# Patient Record
Sex: MALE | Race: WHITE | NOT HISPANIC OR LATINO | Employment: FULL TIME | URBAN - METROPOLITAN AREA
[De-identification: names, ages, dates, MRNs, and addresses within clinical notes are randomized per-mention and may not be internally consistent; named-entity substitution may affect disease eponyms.]

---

## 2024-06-28 ENCOUNTER — OFFICE VISIT (OUTPATIENT)
Dept: FAMILY MEDICINE | Facility: CLINIC | Age: 34
End: 2024-06-28
Payer: COMMERCIAL

## 2024-06-28 ENCOUNTER — OFFICE VISIT (OUTPATIENT)
Dept: ORTHOPEDICS | Facility: CLINIC | Age: 34
End: 2024-06-28
Payer: COMMERCIAL

## 2024-06-28 ENCOUNTER — HOSPITAL ENCOUNTER (OUTPATIENT)
Dept: RADIOLOGY | Facility: HOSPITAL | Age: 34
Discharge: HOME | End: 2024-06-28
Attending: ORTHOPAEDIC SURGERY
Payer: COMMERCIAL

## 2024-06-28 VITALS
HEART RATE: 105 BPM | DIASTOLIC BLOOD PRESSURE: 106 MMHG | SYSTOLIC BLOOD PRESSURE: 156 MMHG | TEMPERATURE: 98 F | HEIGHT: 72 IN | OXYGEN SATURATION: 96 % | WEIGHT: 298.5 LBS | BODY MASS INDEX: 40.43 KG/M2

## 2024-06-28 VITALS — BODY MASS INDEX: 40.43 KG/M2 | WEIGHT: 298.5 LBS | HEIGHT: 72 IN

## 2024-06-28 DIAGNOSIS — M79.641 BILATERAL HAND PAIN: Primary | ICD-10-CM

## 2024-06-28 DIAGNOSIS — M79.641 BILATERAL HAND PAIN: ICD-10-CM

## 2024-06-28 DIAGNOSIS — M67.40 GANGLION CYST: ICD-10-CM

## 2024-06-28 DIAGNOSIS — E66.01 SEVERE OBESITY (BMI >= 40): ICD-10-CM

## 2024-06-28 DIAGNOSIS — Z00.00 ANNUAL PHYSICAL EXAM: Primary | ICD-10-CM

## 2024-06-28 DIAGNOSIS — I10 PRIMARY HYPERTENSION: ICD-10-CM

## 2024-06-28 DIAGNOSIS — R06.81 APNEA: ICD-10-CM

## 2024-06-28 DIAGNOSIS — M79.642 BILATERAL HAND PAIN: ICD-10-CM

## 2024-06-28 DIAGNOSIS — R21 RASH: ICD-10-CM

## 2024-06-28 DIAGNOSIS — M79.642 BILATERAL HAND PAIN: Primary | ICD-10-CM

## 2024-06-28 PROCEDURE — 73130 X-RAY EXAM OF HAND: CPT | Mod: 26,RT,, | Performed by: RADIOLOGY

## 2024-06-28 PROCEDURE — 1159F MED LIST DOCD IN RCRD: CPT | Mod: CPTII,S$GLB,, | Performed by: ORTHOPAEDIC SURGERY

## 2024-06-28 PROCEDURE — 99999 PR PBB SHADOW E&M-EST. PATIENT-LVL III: CPT | Mod: PBBFAC,,, | Performed by: ORTHOPAEDIC SURGERY

## 2024-06-28 PROCEDURE — 4010F ACE/ARB THERAPY RXD/TAKEN: CPT | Mod: CPTII,S$GLB,, | Performed by: ORTHOPAEDIC SURGERY

## 2024-06-28 PROCEDURE — 1160F RVW MEDS BY RX/DR IN RCRD: CPT | Mod: CPTII,S$GLB,, | Performed by: ORTHOPAEDIC SURGERY

## 2024-06-28 PROCEDURE — 99999 PR PBB SHADOW E&M-NEW PATIENT-LVL IV: CPT | Mod: PBBFAC,,, | Performed by: FAMILY MEDICINE

## 2024-06-28 PROCEDURE — 99203 OFFICE O/P NEW LOW 30 MIN: CPT | Mod: S$GLB,,, | Performed by: ORTHOPAEDIC SURGERY

## 2024-06-28 PROCEDURE — 73130 X-RAY EXAM OF HAND: CPT | Mod: TC,RT

## 2024-06-28 PROCEDURE — 3008F BODY MASS INDEX DOCD: CPT | Mod: CPTII,S$GLB,, | Performed by: ORTHOPAEDIC SURGERY

## 2024-06-28 RX ORDER — LISINOPRIL 20 MG/1
20 TABLET ORAL NIGHTLY
Qty: 30 TABLET | Refills: 1 | Status: SHIPPED | OUTPATIENT
Start: 2024-06-28 | End: 2025-06-28

## 2024-06-28 NOTE — PROGRESS NOTES
Subjective:     Patient ID: Bebeto Ho is a 33 y.o. male.    Chief Complaint: Pain of the Right Wrist      HPI:  The patient is a 33-year-old male with a right wrist radial volar ganglion cyst.  It is not symptomatic at this time and not bothersome    History reviewed. No pertinent past medical history.  Past Surgical History:   Procedure Laterality Date    VASECTOMY       Family History   Family history unknown: Yes     Social History     Socioeconomic History    Marital status:     Number of children: 2   Occupational History    Occupation: teacher   Tobacco Use    Smoking status: Never     Passive exposure: Never    Smokeless tobacco: Never   Substance and Sexual Activity    Alcohol use: Yes    Drug use: Never    Sexual activity: Yes     Partners: Female     Social Determinants of Health     Financial Resource Strain: Low Risk  (6/27/2024)    Overall Financial Resource Strain (CARDIA)     Difficulty of Paying Living Expenses: Not very hard   Food Insecurity: No Food Insecurity (6/27/2024)    Hunger Vital Sign     Worried About Running Out of Food in the Last Year: Never true     Ran Out of Food in the Last Year: Never true   Physical Activity: Unknown (6/27/2024)    Exercise Vital Sign     Days of Exercise per Week: 0 days   Stress: Stress Concern Present (6/27/2024)    [employer] - Occupational Stress Questionnaire     Feeling of Stress : To some extent   Housing Stability: Unknown (6/27/2024)    Housing Stability Vital Sign     Unable to Pay for Housing in the Last Year: No     Medication List with Changes/Refills   Current Medications    LISINOPRIL (PRINIVIL,ZESTRIL) 20 MG TABLET    Take 1 tablet (20 mg total) by mouth every evening.     Review of patient's allergies indicates:   Allergen Reactions    Poison ivy extract Blisters, Itching, Rash and Swelling    Wasp venom Hives and Swelling     Review of Systems   Constitutional: Negative for malaise/fatigue.   HENT:   Negative for hearing loss.    Eyes:  Negative for double vision and visual disturbance.   Cardiovascular:  Negative for chest pain.   Respiratory:  Positive for sleep disturbances due to breathing.    Endocrine: Negative for cold intolerance.   Hematologic/Lymphatic: Does not bruise/bleed easily.   Skin:  Negative for poor wound healing and suspicious lesions.   Musculoskeletal:  Negative for gout, joint pain and joint swelling.   Gastrointestinal:  Negative for nausea and vomiting.   Genitourinary:  Negative for dysuria.   Neurological:  Negative for numbness, paresthesias and sensory change.   Psychiatric/Behavioral:  Negative for depression, memory loss and substance abuse. The patient is not nervous/anxious.    Allergic/Immunologic: Negative for persistent infections.       Objective:   Body mass index is 40.48 kg/m².  There were no vitals filed for this visit.             General    Constitutional: He is oriented to person, place, and time. He appears well-developed and well-nourished. No distress.   HENT:   Head: Normocephalic.   Eyes: EOM are normal.   Pulmonary/Chest: Effort normal.   Neurological: He is oriented to person, place, and time.   Psychiatric: He has a normal mood and affect.             Right Hand/Wrist Exam     Inspection   Scars: Wrist - absent Hand -  absent  Effusion: Wrist - absent Hand -  absent    Other     Neuorologic Exam    Median Distribution: normal  Ulnar Distribution: normal  Radial Distribution: normal    Comments:  The patient has a 1 cm right wrist radial volar ganglion cyst.  It is clinically asymptomatic          Vascular Exam       Capillary Refill  Right Hand: normal capillary refill          Relevant imaging results reviewed and interpreted by me, discussed with the patient and / or family today radiographs right wrist were normal  Assessment:     Encounter Diagnosis   Name Primary?    Ganglion cyst     Right radial volar wrist    Plan:       The patient wishes to watch it  at this point if it does get larger or become bothersome he could have an excision.              Disclaimer: This note was prepared using a voice recognition system and is likely to have sound alike errors within the text.

## 2024-06-28 NOTE — PROGRESS NOTES
Subjective:       Patient ID: Bebeto Ho is a 33 y.o. male.    Chief Complaint: Establish Care      HPI Comments:       Current Outpatient Medications:     lisinopriL (PRINIVIL,ZESTRIL) 20 MG tablet, Take 1 tablet (20 mg total) by mouth every evening., Disp: 30 tablet, Rfl: 1      This my 1st time seeing this patient.  He would like to establish care.  He has not had much medical care lately.     at [facility].  .  Two children.  Has had vasectomy.      Has had some elevated blood pressure readings at different times recently.  Gained 100 lb over the last few years.  Had previously lost that weight.    Wife says he has apnea.  Sometimes lots of sweating at night.  Not always rested.  Generally tired    Complains of a rash/mottling on his feet and ankles.  Also has a cyst on the anterior surface of his right wrist.    No tobacco or nicotine products.  Drinks at least 2 cocktails every night.  Not much exercise    Family history:  Does not know much about the health of his parents, though his father  early.  Thinks it grandmother had heart disease, diabetes, Alzheimer's.      Review of Systems   Constitutional:  Negative for activity change, appetite change and fever.   HENT:  Negative for sore throat.    Respiratory:  Negative for cough and shortness of breath.    Cardiovascular:  Negative for chest pain.   Gastrointestinal:  Negative for abdominal pain, diarrhea and nausea.   Genitourinary:  Negative for difficulty urinating.   Musculoskeletal:  Negative for arthralgias and myalgias.   Skin:  Positive for color change.   Neurological:  Negative for dizziness and headaches.       Objective:      Vitals:    24 0936   BP: (!) 156/106   Pulse: 105   Temp: 98.4 °F (36.9 °C)   TempSrc: Oral   SpO2: 96%   Weight: 135.4 kg (298 lb 8.1 oz)   Height: 6' (1.829 m)   PainSc: 0-No pain     Physical Exam  Vitals and nursing note reviewed.   Constitutional:       General: He is not in acute  distress.     Appearance: He is well-developed. He is not diaphoretic.   HENT:      Head: Normocephalic.   Neck:      Thyroid: No thyromegaly.   Cardiovascular:      Rate and Rhythm: Normal rate and regular rhythm.      Heart sounds: Normal heart sounds. No murmur heard.  Pulmonary:      Effort: Pulmonary effort is normal.      Breath sounds: Normal breath sounds. No wheezing or rales.   Abdominal:      General: There is no distension.      Palpations: Abdomen is soft.   Musculoskeletal:      Cervical back: Neck supple.      Right lower leg: No edema.      Left lower leg: No edema.   Lymphadenopathy:      Cervical: No cervical adenopathy.   Skin:     General: Skin is warm and dry.      Comments: Petechial type rash on ankles.  Lenox tinea pedis   Neurological:      Mental Status: He is alert and oriented to person, place, and time.   Psychiatric:         Behavior: Behavior normal.         Thought Content: Thought content normal.         Judgment: Judgment normal.         Assessment:       1. Annual physical exam    2. Primary hypertension    3. Ganglion cyst    4. Rash    5. Apnea    6. Severe obesity (BMI >= 40)        Plan:   Annual physical exam  Comments:  Lipid profile, A1c  Orders:  -     Comprehensive Metabolic Panel; Future; Expected date: 06/28/2024  -     Hemoglobin A1C; Future; Expected date: 06/28/2024  -     Hepatitis C Antibody; Future; Expected date: 06/28/2024  -     HIV 1/2 Ag/Ab (4th Gen); Future; Expected date: 06/28/2024  -     Lipid Panel; Future; Expected date: 06/28/2024  -     Testosterone; Future; Expected date: 06/28/2024  -     TSH; Future; Expected date: 06/28/2024  -     CBC Auto Differential; Future; Expected date: 06/28/2024    Primary hypertension  Comments:  Start lisinopril 20 mg.  Recheck at lab visit next week.  Follow-up here in one-month.  Orders:  -     lisinopriL (PRINIVIL,ZESTRIL) 20 MG tablet; Take 1 tablet (20 mg total) by mouth every evening.  Dispense: 30 tablet;  Refill: 1    Ganglion cyst  Comments:  Orthopedic consult  Orders:  -     Ambulatory referral/consult to Orthopedics; Future; Expected date: 07/05/2024    Rash  Comments:  Derm consult  Orders:  -     Ambulatory referral/consult to Dermatology; Future; Expected date: 07/05/2024    Apnea  Comments:  Sleep evaluation ordered  Orders:  -     Ambulatory referral/consult to Sleep Disorders; Future; Expected date: 07/05/2024    Severe obesity (BMI >= 40)  Comments:  Goal to lose to 250 lb over the next few years.  Cut back alcohol.  Increase exercise

## 2024-07-03 ENCOUNTER — TELEPHONE (OUTPATIENT)
Dept: SLEEP MEDICINE | Facility: CLINIC | Age: 34
End: 2024-07-03

## 2024-07-03 ENCOUNTER — OFFICE VISIT (OUTPATIENT)
Dept: SLEEP MEDICINE | Facility: CLINIC | Age: 34
End: 2024-07-03
Payer: COMMERCIAL

## 2024-07-03 VITALS
DIASTOLIC BLOOD PRESSURE: 101 MMHG | HEIGHT: 72 IN | SYSTOLIC BLOOD PRESSURE: 182 MMHG | HEART RATE: 103 BPM | RESPIRATION RATE: 16 BRPM | BODY MASS INDEX: 40.46 KG/M2 | OXYGEN SATURATION: 96 % | WEIGHT: 298.75 LBS

## 2024-07-03 DIAGNOSIS — R06.81 APNEA: ICD-10-CM

## 2024-07-03 DIAGNOSIS — I10 PRIMARY HYPERTENSION: ICD-10-CM

## 2024-07-03 DIAGNOSIS — G47.33 OSA (OBSTRUCTIVE SLEEP APNEA): Primary | ICD-10-CM

## 2024-07-03 DIAGNOSIS — E66.01 SEVERE OBESITY (BMI >= 40): ICD-10-CM

## 2024-07-03 PROCEDURE — 99204 OFFICE O/P NEW MOD 45 MIN: CPT | Mod: S$GLB,,, | Performed by: NURSE PRACTITIONER

## 2024-07-03 PROCEDURE — 3008F BODY MASS INDEX DOCD: CPT | Mod: CPTII,S$GLB,, | Performed by: NURSE PRACTITIONER

## 2024-07-03 PROCEDURE — 3077F SYST BP >= 140 MM HG: CPT | Mod: CPTII,S$GLB,, | Performed by: NURSE PRACTITIONER

## 2024-07-03 PROCEDURE — 3080F DIAST BP >= 90 MM HG: CPT | Mod: CPTII,S$GLB,, | Performed by: NURSE PRACTITIONER

## 2024-07-03 PROCEDURE — 1159F MED LIST DOCD IN RCRD: CPT | Mod: CPTII,S$GLB,, | Performed by: NURSE PRACTITIONER

## 2024-07-03 PROCEDURE — 4010F ACE/ARB THERAPY RXD/TAKEN: CPT | Mod: CPTII,S$GLB,, | Performed by: NURSE PRACTITIONER

## 2024-07-03 PROCEDURE — 1160F RVW MEDS BY RX/DR IN RCRD: CPT | Mod: CPTII,S$GLB,, | Performed by: NURSE PRACTITIONER

## 2024-07-03 PROCEDURE — 99999 PR PBB SHADOW E&M-EST. PATIENT-LVL IV: CPT | Mod: PBBFAC,,, | Performed by: NURSE PRACTITIONER

## 2024-07-03 NOTE — PROGRESS NOTES
Subjective:      Patient ID: Bebeto Ho is a 33 y.o. male.    Chief Complaint: Sleep Apnea    HPI    Patient presents today for evaluation of sleep apnea.  Patient with snoring and witnessed apneas. Patient not having problems falling asleep, but wakes up frequently throughout the night. Patient with daytime hypersomnolence.  Patient has had symptoms for a few years. Comorbidities include newly dx HTN.   Bedtime: 10:30 PM  Wake time: 7 AM    STOP - BANG Questionnaire:     1. Snoring : Do you snore loudly ?    Yes    2. Tired : Do you often feel tired, fatigued, or sleepy during daytime?   Yes    3. Observed: Has anyone observed you stop breathing during your sleep?   Yes    4. Blood pressure : Do you have or are you being treated for high blood pressure?   Yes    5. BMI :BMI more than 35 kg/m2?   Yes    6. Age : Age over 50 yr old?   No    7. Neck circumference: Neck circumference greater than 40 cm?   Yes    8. Gender: Gender male?   Yes    High risk of QIANA: Yes 5 - 8  Intermediate risk of QIANA: Yes 3 - 4  Low risk of QIANA: Yes 0 - 2      References:   STOP Questionnaire   A Tool to Screen Patients for Obstructive Sleep Apnea: ISSAC Perez.R.C.P.C., Jorgito Long M.B.B.S., Tylor Hammond M.D.,Brielle Faria, Ph.D., MILTON Lanza.B.B.S.,_ MILTON De La Garza.Sc.,_ Pietro Ellis M.D., João Altamirano F.R.C.P.C.; Anesthesiology 2008; 108:812-21 Copyright © 2008, the American Society of Anesthesiologists, Inc. [employer]       [place] Questionnaire (validated QIANA screening questionnaire)    Yes -- Snoring/apnea    Yes -- Fatigue    Body mass index is Body mass index is 40.51 kg/m²..  (>25 is overweight, >30 is obese)    Blood Pressure = Hypertension  (PreHTN 120-139/80-89, Stg1 140-159/90-99, Stg2 >160/>100)  [place] = three of three QIANA categories are positive (high risk is 2-3 positive categories)         7/3/2024    10:00 AM   EPWORTH SLEEPINESS SCALE   Sitting and  reading 0   Watching TV 0   Sitting, inactive in a public place (e.g. a theatre or a meeting) 0   As a passenger in a car for an hour without a break 0   Lying down to rest in the afternoon when circumstances permit 2   Sitting and talking to someone 0   Sitting quietly after a lunch without alcohol 0   In a car, while stopped for a few minutes in traffic 0   Total score 2      (validated sleepiness questionnaire with a higher score indicating greater sleepiness; range 0-24)    Patient Active Problem List   Diagnosis    Primary hypertension    Apnea    Severe obesity (BMI >= 40)         BP (!) 182/101 (BP Location: Right arm) Comment (BP Location): 170 101  Pulse 103   Resp 16   Ht 6' (1.829 m)   Wt 135.5 kg (298 lb 11.6 oz)   SpO2 96%   BMI 40.51 kg/m²   Body mass index is 40.51 kg/m².    Review of Systems   Constitutional:  Positive for fatigue.   HENT: Negative.     Respiratory:  Positive for snoring and somnolence.    Cardiovascular: Negative.    Musculoskeletal: Negative.    Gastrointestinal: Negative.    Neurological: Negative.    Psychiatric/Behavioral:  Positive for sleep disturbance.          Objective:      Physical Exam  Constitutional:       Appearance: He is well-developed. He is obese.   HENT:      Head: Normocephalic and atraumatic.      Mouth/Throat:      Comments: Mallampati Score: II  Neck:      Comments: Neck circumference: 17 inches   Cardiovascular:      Rate and Rhythm: Normal rate and regular rhythm.   Pulmonary:      Effort: Pulmonary effort is normal.      Breath sounds: Normal breath sounds.   Abdominal:      Palpations: Abdomen is soft.   Musculoskeletal:         General: Normal range of motion.      Cervical back: Normal range of motion and neck supple.   Skin:     General: Skin is warm and dry.   Neurological:      Mental Status: He is alert and oriented to person, place, and time.   Psychiatric:         Mood and Affect: Mood normal.         Behavior: Behavior normal.        Personal Diagnostic Review      Assessment:     1. QIANA (obstructive sleep apnea)    2. Apnea    3. Severe obesity (BMI >= 40)    4. Primary hypertension       Outpatient Encounter Medications as of 7/3/2024   Medication Sig Dispense Refill    lisinopriL (PRINIVIL,ZESTRIL) 20 MG tablet Take 1 tablet (20 mg total) by mouth every evening. 30 tablet 1     No facility-administered encounter medications on file as of 7/3/2024.     Orders Placed This Encounter   Procedures    Home Sleep Study     Standing Status:   Future     Standing Expiration Date:   7/3/2025     Scheduling Instructions:      2 night protocol     Plan:     1. QIANA (obstructive sleep apnea)  -     Home Sleep Study; Future    2. Apnea  Comments:  Sleep evaluation ordered  Overview:  Sleep evaluation ordered    Orders:  -     Ambulatory referral/consult to Sleep Disorders    3. Severe obesity (BMI >= 40)  Overview:  Goal to lose to 250 lb over the next few years.  Cut back alcohol.  Increase exercise      4. Primary hypertension  Overview:  Start lisinopril 20 mg.  Recheck at lab visit next week.  Follow-up here in one-month.

## 2024-07-03 NOTE — TELEPHONE ENCOUNTER
----- Message from Clarisa Frost sent at 7/3/2024  4:10 PM CDT -----  [facility]

## 2024-07-05 ENCOUNTER — LAB VISIT (OUTPATIENT)
Dept: LAB | Facility: HOSPITAL | Age: 34
End: 2024-07-05
Attending: FAMILY MEDICINE
Payer: COMMERCIAL

## 2024-07-05 ENCOUNTER — CLINICAL SUPPORT (OUTPATIENT)
Dept: FAMILY MEDICINE | Facility: CLINIC | Age: 34
End: 2024-07-05
Payer: COMMERCIAL

## 2024-07-05 VITALS — HEART RATE: 83 BPM | DIASTOLIC BLOOD PRESSURE: 97 MMHG | SYSTOLIC BLOOD PRESSURE: 155 MMHG

## 2024-07-05 DIAGNOSIS — Z00.00 ANNUAL PHYSICAL EXAM: ICD-10-CM

## 2024-07-05 DIAGNOSIS — I10 PRIMARY HYPERTENSION: Primary | ICD-10-CM

## 2024-07-05 LAB
ALBUMIN SERPL BCP-MCNC: 4 G/DL (ref 3.5–5.2)
ALP SERPL-CCNC: 59 U/L (ref 55–135)
ALT SERPL W/O P-5'-P-CCNC: 51 U/L (ref 10–44)
ANION GAP SERPL CALC-SCNC: 11 MMOL/L (ref 8–16)
AST SERPL-CCNC: 24 U/L (ref 10–40)
BASOPHILS # BLD AUTO: 0.05 K/UL (ref 0–0.2)
BASOPHILS NFR BLD: 1 % (ref 0–1.9)
BILIRUB SERPL-MCNC: 0.7 MG/DL (ref 0.1–1)
BUN SERPL-MCNC: 14 MG/DL (ref 6–20)
CALCIUM SERPL-MCNC: 9.3 MG/DL (ref 8.7–10.5)
CHLORIDE SERPL-SCNC: 105 MMOL/L (ref 95–110)
CHOLEST SERPL-MCNC: 224 MG/DL (ref 120–199)
CHOLEST/HDLC SERPL: 4.7 {RATIO} (ref 2–5)
CO2 SERPL-SCNC: 24 MMOL/L (ref 23–29)
CREAT SERPL-MCNC: 0.9 MG/DL (ref 0.5–1.4)
DIFFERENTIAL METHOD BLD: ABNORMAL
EOSINOPHIL # BLD AUTO: 0.1 K/UL (ref 0–0.5)
EOSINOPHIL NFR BLD: 1.4 % (ref 0–8)
ERYTHROCYTE [DISTWIDTH] IN BLOOD BY AUTOMATED COUNT: 11.9 % (ref 11.5–14.5)
EST. GFR  (NO RACE VARIABLE): >60 ML/MIN/1.73 M^2
ESTIMATED AVG GLUCOSE: 105 MG/DL (ref 68–131)
GLUCOSE SERPL-MCNC: 103 MG/DL (ref 70–110)
HBA1C MFR BLD: 5.3 % (ref 4–5.6)
HCT VFR BLD AUTO: 44.7 % (ref 40–54)
HCV AB SERPL QL IA: NORMAL
HDLC SERPL-MCNC: 48 MG/DL (ref 40–75)
HDLC SERPL: 21.4 % (ref 20–50)
HGB BLD-MCNC: 15 G/DL (ref 14–18)
HIV 1+2 AB+HIV1 P24 AG SERPL QL IA: NORMAL
IMM GRANULOCYTES # BLD AUTO: 0.03 K/UL (ref 0–0.04)
IMM GRANULOCYTES NFR BLD AUTO: 0.6 % (ref 0–0.5)
LDLC SERPL CALC-MCNC: 159.8 MG/DL (ref 63–159)
LYMPHOCYTES # BLD AUTO: 1.3 K/UL (ref 1–4.8)
LYMPHOCYTES NFR BLD: 25.8 % (ref 18–48)
MCH RBC QN AUTO: 28.6 PG (ref 27–31)
MCHC RBC AUTO-ENTMCNC: 33.6 G/DL (ref 32–36)
MCV RBC AUTO: 85 FL (ref 82–98)
MONOCYTES # BLD AUTO: 0.5 K/UL (ref 0.3–1)
MONOCYTES NFR BLD: 10.6 % (ref 4–15)
NEUTROPHILS # BLD AUTO: 3.1 K/UL (ref 1.8–7.7)
NEUTROPHILS NFR BLD: 60.6 % (ref 38–73)
NONHDLC SERPL-MCNC: 176 MG/DL
NRBC BLD-RTO: 0 /100 WBC
PLATELET # BLD AUTO: 298 K/UL (ref 150–450)
PMV BLD AUTO: 9.6 FL (ref 9.2–12.9)
POTASSIUM SERPL-SCNC: 4 MMOL/L (ref 3.5–5.1)
PROT SERPL-MCNC: 6.9 G/DL (ref 6–8.4)
RBC # BLD AUTO: 5.24 M/UL (ref 4.6–6.2)
SODIUM SERPL-SCNC: 140 MMOL/L (ref 136–145)
TESTOST SERPL-MCNC: 348 NG/DL (ref 304–1227)
TRIGL SERPL-MCNC: 81 MG/DL (ref 30–150)
TSH SERPL DL<=0.005 MIU/L-ACNC: 1.95 UIU/ML (ref 0.4–4)
WBC # BLD AUTO: 5.08 K/UL (ref 3.9–12.7)

## 2024-07-05 PROCEDURE — 85025 COMPLETE CBC W/AUTO DIFF WBC: CPT | Performed by: FAMILY MEDICINE

## 2024-07-05 PROCEDURE — 83036 HEMOGLOBIN GLYCOSYLATED A1C: CPT | Performed by: FAMILY MEDICINE

## 2024-07-05 PROCEDURE — 87389 HIV-1 AG W/HIV-1&-2 AB AG IA: CPT | Performed by: FAMILY MEDICINE

## 2024-07-05 PROCEDURE — 99999 PR PBB SHADOW E&M-EST. PATIENT-LVL I: CPT | Mod: PBBFAC,,,

## 2024-07-05 PROCEDURE — 84443 ASSAY THYROID STIM HORMONE: CPT | Performed by: FAMILY MEDICINE

## 2024-07-05 PROCEDURE — 84403 ASSAY OF TOTAL TESTOSTERONE: CPT | Performed by: FAMILY MEDICINE

## 2024-07-05 PROCEDURE — 80061 LIPID PANEL: CPT | Performed by: FAMILY MEDICINE

## 2024-07-05 PROCEDURE — 36415 COLL VENOUS BLD VENIPUNCTURE: CPT | Mod: PO | Performed by: FAMILY MEDICINE

## 2024-07-05 PROCEDURE — 86803 HEPATITIS C AB TEST: CPT | Performed by: FAMILY MEDICINE

## 2024-07-05 PROCEDURE — 80053 COMPREHEN METABOLIC PANEL: CPT | Performed by: FAMILY MEDICINE

## 2024-07-19 PROCEDURE — 95806 SLEEP STUDY UNATT&RESP EFFT: CPT | Performed by: INTERNAL MEDICINE

## 2024-07-20 PROCEDURE — 95806 SLEEP STUDY UNATT&RESP EFFT: CPT

## 2024-07-22 ENCOUNTER — HOSPITAL ENCOUNTER (OUTPATIENT)
Dept: SLEEP MEDICINE | Facility: HOSPITAL | Age: 34
Discharge: HOME | End: 2024-07-22
Attending: NURSE PRACTITIONER
Payer: COMMERCIAL

## 2024-07-22 DIAGNOSIS — G47.33 OSA (OBSTRUCTIVE SLEEP APNEA): ICD-10-CM

## 2024-07-22 PROCEDURE — 95806 SLEEP STUDY UNATT&RESP EFFT: CPT | Performed by: INTERNAL MEDICINE

## 2024-07-23 PROBLEM — G47.33 OSA (OBSTRUCTIVE SLEEP APNEA): Status: ACTIVE | Noted: 2024-07-23

## 2024-07-23 PROCEDURE — 95800 SLP STDY UNATTENDED: CPT | Mod: 26,,, | Performed by: INTERNAL MEDICINE

## 2024-07-23 NOTE — PROCEDURES
PHYSICIAN INTERPRETATION AND COMMENTS: Findings are consistent with mild, positional obstructive sleep apnea(QIANA).  There is insufficient non-supine study time to assess the severity of non-positional obstructive sleep apnea (QIANA).  CLINICAL HISTORY: 33 year old male presented with: 18 inch neck, BMI of 40.4, an [place] sleepiness score of 0, history of  hypertension and symptoms of nocturnal snoring and witnessed apneas. Based on the clinical history, the patient has a  high pre-test probability of having Severe QIANA.  SLEEP STUDY FINDINGS: Patient underwent a 2 night Home Sleep Test and by behavioral criteria, slept for approximately  13.48 hours, with a sleep latency of 6 minutes and a sleep efficiency of 98%. The patient slept supine 99.4% of the night  based on valid recording time of 13.53 hours. When considering more subtle measures of sleep disordered breathing, the  overall respiratory disturbance index is mild(RDI=11) based on a 1% hypopnea desaturation criteria with confirmation by  surrogate arousal indicators. The apneas/hypopneas are accompanied by minimal oxygen desaturation (percent time  below 90% SpO2: 0%, Min SpO2: 84.2%). The average desaturation across all sleep disordered breathing events is 2.5%.  Snoring occurs for 11.7% (30 dB) of the study, 1.1% is very loud. The mean pulse rate is 80.2 BPM, with frequent pulse rate  variability (56 events with >= 6 BPM increase/decrease per hour).  TREATMENT CONSIDERATIONS: For a patient with mild asymptomatic QIANA, nasal continuous positive airway pressure  (CPAP/AutoPAP) therapy or alternative therapies for treatment should be considered, i.e., Mandibular advancement splint  (MAS), referral to an ENT surgeon for modification to the airway, and/or weight loss or behavioral therapy to reduce the  potential risk of daytime somnolence, hypertension, cardiovascular disease, stroke and diabetes. Based on the QIANA  Supine data in the study, a Mandibular  "Advancement Splint (MAS) will likely provide treatment benefit independent of QIANA  severity.  DISEASE MANAGEMENT CONSIDERATIONS: None.      Dear [place]  [id number] [facility]  [place] [id number]/Joon Duarte MD         The sleep study that you ordered is complete.  You have ordered sleep LAB services to perform the sleep study for Bebeto Ho .      Please find Sleep Study result in  the "Media tab" of Chart Review menu.        You can look  for the report in the  Media by the document type "Sleep Study Documents". Alphabetizing  "Document type" column helps to find the SLEEP STUDY report  Faster.       As the ordering provider, you are responsible for reviewing the results and implementing a treatment plan with your patient.    If you need a Sleep Medicine provider to explain the sleep study findings and arrange treatment for the patient, please refer patient for consultation to our Sleep Clinic via [facility] with Ambulatory Consult Sleep.     To do that please place an order for an  "Ambulatory Consult Sleep" -  order , it will go to our clinic work queue for our staff  to contact the patient for an appointment.      For any questions, please contact our sleep lab  staff at [phone] to talk to clinical staff          Omar Boyer MD    "

## 2024-07-26 ENCOUNTER — LAB VISIT (OUTPATIENT)
Dept: LAB | Facility: HOSPITAL | Age: 34
End: 2024-07-26
Attending: FAMILY MEDICINE
Payer: COMMERCIAL

## 2024-07-26 ENCOUNTER — TELEPHONE (OUTPATIENT)
Dept: PSYCHIATRY | Facility: CLINIC | Age: 34
End: 2024-07-26
Payer: COMMERCIAL

## 2024-07-26 ENCOUNTER — OFFICE VISIT (OUTPATIENT)
Dept: FAMILY MEDICINE | Facility: CLINIC | Age: 34
End: 2024-07-26
Payer: COMMERCIAL

## 2024-07-26 VITALS
HEIGHT: 72 IN | TEMPERATURE: 98 F | SYSTOLIC BLOOD PRESSURE: 134 MMHG | DIASTOLIC BLOOD PRESSURE: 92 MMHG | OXYGEN SATURATION: 97 % | WEIGHT: 298.5 LBS | HEART RATE: 96 BPM | BODY MASS INDEX: 40.43 KG/M2

## 2024-07-26 DIAGNOSIS — F41.9 ANXIETY: ICD-10-CM

## 2024-07-26 DIAGNOSIS — I10 PRIMARY HYPERTENSION: ICD-10-CM

## 2024-07-26 DIAGNOSIS — G47.33 OSA (OBSTRUCTIVE SLEEP APNEA): ICD-10-CM

## 2024-07-26 DIAGNOSIS — E78.00 PURE HYPERCHOLESTEROLEMIA: ICD-10-CM

## 2024-07-26 DIAGNOSIS — I10 PRIMARY HYPERTENSION: Primary | ICD-10-CM

## 2024-07-26 LAB
BILIRUB UR QL STRIP: NEGATIVE
CLARITY UR REFRACT.AUTO: CLEAR
COLOR UR AUTO: COLORLESS
GLUCOSE UR QL STRIP: NEGATIVE
HGB UR QL STRIP: NEGATIVE
KETONES UR QL STRIP: NEGATIVE
LEUKOCYTE ESTERASE UR QL STRIP: NEGATIVE
NITRITE UR QL STRIP: NEGATIVE
PH UR STRIP: 7 [PH] (ref 5–8)
PROT UR QL STRIP: NEGATIVE
SP GR UR STRIP: 1.01 (ref 1–1.03)
URN SPEC COLLECT METH UR: ABNORMAL

## 2024-07-26 PROCEDURE — 81003 URINALYSIS AUTO W/O SCOPE: CPT | Performed by: FAMILY MEDICINE

## 2024-07-26 PROCEDURE — 99999 PR PBB SHADOW E&M-EST. PATIENT-LVL III: CPT | Mod: PBBFAC,,, | Performed by: FAMILY MEDICINE

## 2024-07-26 RX ORDER — LISINOPRIL 40 MG/1
40 TABLET ORAL NIGHTLY
Qty: 90 TABLET | Refills: 1 | Status: SHIPPED | OUTPATIENT
Start: 2024-07-26 | End: 2025-07-26

## 2024-07-26 NOTE — TELEPHONE ENCOUNTER
----- Message from Mariam Aquino sent at 7/26/2024 10:37 AM CDT -----  Pt is calling in regards to be scheduled from referral to [facility]. Pt would like to be schedule on Fridays only mid morning. Pt can be reached at [phone].      Thanks  VINICIO

## 2024-07-26 NOTE — PROGRESS NOTES
Subjective:       Patient ID: Bebeto Ho is a 33 y.o. male.    Chief Complaint: Follow-up (4 week f/u)      HPI Comments:       Current Outpatient Medications:     lisinopriL (PRINIVIL,ZESTRIL) 40 MG tablet, Take 1 tablet (40 mg total) by mouth every evening., Disp: 90 tablet, Rfl: 1      One-month follow-up.  Taking lisinopril 20 mg daily.  Does not check blood pressure outside of here.  Blood pressure much improved.  Will increase dose today and get a nurse visit in 3 weeks.      Has cut his alcohol consumption down to about 2 drinks per day.  ALT was slightly high.  Will monitor.      Some situational anxiety.  No significant mood swings.  Wife concerned.  Will get a Psychology consult.      Review of Systems   Constitutional:  Negative for activity change, appetite change and fever.   HENT:  Negative for sore throat.    Respiratory:  Negative for cough and shortness of breath.    Cardiovascular:  Negative for chest pain.   Gastrointestinal:  Negative for abdominal pain, diarrhea and nausea.   Genitourinary:  Negative for difficulty urinating.   Musculoskeletal:  Negative for arthralgias and myalgias.   Neurological:  Negative for dizziness and headaches.   Psychiatric/Behavioral:  The patient is nervous/anxious.        Objective:      Vitals:    07/26/24 0856   BP: (!) 134/92   Pulse: 96   Temp: 97.8 °F (36.6 °C)   TempSrc: Tympanic   SpO2: 97%   Weight: 135.4 kg (298 lb 8.1 oz)   Height: 6' (1.829 m)   PainSc: 0-No pain     Physical Exam  Vitals and nursing note reviewed.   Constitutional:       General: He is not in acute distress.     Appearance: He is well-developed. He is not diaphoretic.   HENT:      Head: Normocephalic.   Neck:      Thyroid: No thyromegaly.   Cardiovascular:      Rate and Rhythm: Normal rate and regular rhythm.      Heart sounds: Normal heart sounds. No murmur heard.  Pulmonary:      Effort: Pulmonary effort is normal. No respiratory distress.      Breath sounds: Normal breath  sounds.   Abdominal:      General: There is no distension.      Palpations: Abdomen is soft.   Musculoskeletal:      Cervical back: Neck supple.   Lymphadenopathy:      Cervical: No cervical adenopathy.   Skin:     General: Skin is warm and dry.   Neurological:      Mental Status: He is alert and oriented to person, place, and time.   Psychiatric:         Mood and Affect: Mood normal.         Behavior: Behavior normal.         Thought Content: Thought content normal.         Judgment: Judgment normal.         Assessment:       1. Primary hypertension    2. Pure hypercholesterolemia    3. QIANA (obstructive sleep apnea)    4. Primary hypertension    5. Anxiety        Plan:   Primary hypertension  Comments:  Improved control.  Increase to 40 mg daily.  BNP today.  Nurse visit in 3 weeks  Orders:  -     lisinopriL (PRINIVIL,ZESTRIL) 40 MG tablet; Take 1 tablet (40 mg total) by mouth every evening.  Dispense: 90 tablet; Refill: 1  -     Urinalysis; Future; Expected date: 07/26/2024  -     Basic Metabolic Panel; Future; Expected date: 07/26/2024    Pure hypercholesterolemia  Comments:  Talked about decreasing fat in diet, and increasing fiber.  Has moderate alcohol    QIANA (obstructive sleep apnea)  Comments:  Has a appointment in September    Primary hypertension  Comments:  Start lisinopril 20 mg.  Recheck at lab visit next week.  Follow-up here in one-month.  Orders:  -     lisinopriL (PRINIVIL,ZESTRIL) 40 MG tablet; Take 1 tablet (40 mg total) by mouth every evening.  Dispense: 90 tablet; Refill: 1  -     Urinalysis; Future; Expected date: 07/26/2024  -     Basic Metabolic Panel; Future; Expected date: 07/26/2024    Anxiety  Comments:  Psychology consult  Orders:  -     Ambulatory referral/consult to Psychology; Future; Expected date: 08/02/2024

## 2024-08-01 ENCOUNTER — PATIENT MESSAGE (OUTPATIENT)
Dept: FAMILY MEDICINE | Facility: CLINIC | Age: 34
End: 2024-08-01
Payer: COMMERCIAL

## 2024-08-07 ENCOUNTER — OFFICE VISIT (OUTPATIENT)
Dept: SLEEP MEDICINE | Facility: CLINIC | Age: 34
End: 2024-08-07
Payer: COMMERCIAL

## 2024-08-07 VITALS
HEART RATE: 97 BPM | SYSTOLIC BLOOD PRESSURE: 116 MMHG | OXYGEN SATURATION: 98 % | WEIGHT: 299.63 LBS | DIASTOLIC BLOOD PRESSURE: 78 MMHG | RESPIRATION RATE: 18 BRPM | HEIGHT: 72 IN | BODY MASS INDEX: 40.58 KG/M2

## 2024-08-07 DIAGNOSIS — I10 PRIMARY HYPERTENSION: ICD-10-CM

## 2024-08-07 DIAGNOSIS — G47.33 OSA (OBSTRUCTIVE SLEEP APNEA): Primary | ICD-10-CM

## 2024-08-07 DIAGNOSIS — E66.01 SEVERE OBESITY (BMI >= 40): ICD-10-CM

## 2024-08-07 PROCEDURE — 3074F SYST BP LT 130 MM HG: CPT | Mod: CPTII,S$GLB,, | Performed by: NURSE PRACTITIONER

## 2024-08-07 PROCEDURE — 3008F BODY MASS INDEX DOCD: CPT | Mod: CPTII,S$GLB,, | Performed by: NURSE PRACTITIONER

## 2024-08-07 PROCEDURE — 4010F ACE/ARB THERAPY RXD/TAKEN: CPT | Mod: CPTII,S$GLB,, | Performed by: NURSE PRACTITIONER

## 2024-08-07 PROCEDURE — 99999 PR PBB SHADOW E&M-EST. PATIENT-LVL III: CPT | Mod: PBBFAC,,, | Performed by: NURSE PRACTITIONER

## 2024-08-07 PROCEDURE — 1159F MED LIST DOCD IN RCRD: CPT | Mod: CPTII,S$GLB,, | Performed by: NURSE PRACTITIONER

## 2024-08-07 PROCEDURE — 3078F DIAST BP <80 MM HG: CPT | Mod: CPTII,S$GLB,, | Performed by: NURSE PRACTITIONER

## 2024-08-07 PROCEDURE — 3044F HG A1C LEVEL LT 7.0%: CPT | Mod: CPTII,S$GLB,, | Performed by: NURSE PRACTITIONER

## 2024-08-07 PROCEDURE — 99213 OFFICE O/P EST LOW 20 MIN: CPT | Mod: S$GLB,,, | Performed by: NURSE PRACTITIONER

## 2024-08-07 PROCEDURE — 1160F RVW MEDS BY RX/DR IN RCRD: CPT | Mod: CPTII,S$GLB,, | Performed by: NURSE PRACTITIONER

## 2024-08-16 ENCOUNTER — CLINICAL SUPPORT (OUTPATIENT)
Dept: FAMILY MEDICINE | Facility: CLINIC | Age: 34
End: 2024-08-16
Payer: COMMERCIAL

## 2024-08-16 ENCOUNTER — PATIENT MESSAGE (OUTPATIENT)
Dept: ORTHOPEDICS | Facility: CLINIC | Age: 34
End: 2024-08-16
Payer: COMMERCIAL

## 2024-08-16 VITALS — SYSTOLIC BLOOD PRESSURE: 126 MMHG | DIASTOLIC BLOOD PRESSURE: 80 MMHG

## 2024-08-16 DIAGNOSIS — I10 PRIMARY HYPERTENSION: Primary | ICD-10-CM

## 2024-08-16 PROCEDURE — 99999 PR PBB SHADOW E&M-EST. PATIENT-LVL II: CPT | Mod: PBBFAC,,,

## 2024-08-16 NOTE — PROGRESS NOTES
Patient came for BP check. Zeb reading in right arm was 126/80. He takes Lisinopril 40 mg every evening. He also brought [employer] form to have signed by Dr. Duarte from recent labs done.

## 2024-08-30 ENCOUNTER — OFFICE VISIT (OUTPATIENT)
Dept: ORTHOPEDICS | Facility: CLINIC | Age: 34
End: 2024-08-30
Payer: COMMERCIAL

## 2024-08-30 VITALS — HEIGHT: 72 IN | BODY MASS INDEX: 40.58 KG/M2 | WEIGHT: 299.63 LBS

## 2024-08-30 DIAGNOSIS — M67.40 GANGLION CYST: Primary | ICD-10-CM

## 2024-08-30 PROCEDURE — 99999 PR PBB SHADOW E&M-EST. PATIENT-LVL III: CPT | Mod: PBBFAC,,, | Performed by: ORTHOPAEDIC SURGERY

## 2024-08-30 NOTE — PROGRESS NOTES
Subjective:     Patient ID: Bebeto Ho is a 33 y.o. male.    Chief Complaint: Pain of the Right Wrist      HPI:  The patient is a 33-year-old male with a right wrist radial volar ganglion cyst increases in size that he wishes to have excised    History reviewed. No pertinent past medical history.  Past Surgical History:   Procedure Laterality Date    VASECTOMY       Family History   Family history unknown: Yes     Social History     Socioeconomic History    Marital status:     Number of children: 2   Occupational History    Occupation: teacher   Tobacco Use    Smoking status: Never     Passive exposure: Never    Smokeless tobacco: Never   Substance and Sexual Activity    Alcohol use: Yes    Drug use: Never    Sexual activity: Yes     Partners: Female     Social Determinants of Health     Financial Resource Strain: Low Risk  (6/27/2024)    Overall Financial Resource Strain (CARDIA)     Difficulty of Paying Living Expenses: Not very hard   Food Insecurity: No Food Insecurity (6/27/2024)    Hunger Vital Sign     Worried About Running Out of Food in the Last Year: Never true     Ran Out of Food in the Last Year: Never true   Physical Activity: Unknown (6/27/2024)    Exercise Vital Sign     Days of Exercise per Week: 0 days   Stress: Stress Concern Present (6/27/2024)    [employer] [place] [employer] - Occupational Stress Questionnaire     Feeling of Stress : To some extent   Housing Stability: Unknown (6/27/2024)    Housing Stability Vital Sign     Unable to Pay for Housing in the Last Year: No     Medication List with Changes/Refills   Current Medications    LISINOPRIL (PRINIVIL,ZESTRIL) 40 MG TABLET    Take 1 tablet (40 mg total) by mouth every evening.     Review of patient's allergies indicates:   Allergen Reactions    Poison ivy extract Blisters, Itching, Rash and Swelling    Wasp venom Hives and Swelling     Review of Systems   Constitutional: Negative for malaise/fatigue.   HENT:  Negative  for hearing loss.    Eyes:  Negative for double vision and visual disturbance.   Cardiovascular:  Negative for chest pain.   Respiratory:  Negative for shortness of breath.    Endocrine: Negative for cold intolerance.   Hematologic/Lymphatic: Does not bruise/bleed easily.   Skin:  Negative for poor wound healing and suspicious lesions.   Musculoskeletal:  Negative for gout, joint pain and joint swelling.   Gastrointestinal:  Negative for nausea and vomiting.   Genitourinary:  Negative for dysuria.   Neurological:  Negative for numbness, paresthesias and sensory change.   Psychiatric/Behavioral:  Negative for depression, memory loss and substance abuse. The patient is not nervous/anxious.    Allergic/Immunologic: Negative for persistent infections.       Objective:   Body mass index is 40.63 kg/m².  There were no vitals filed for this visit.             General    Constitutional: He is oriented to person, place, and time. He appears well-developed and well-nourished. No distress.   HENT:   Head: Normocephalic.   Mouth/Throat: Oropharynx is clear and moist.   Eyes: EOM are normal.   Cardiovascular:  Normal rate.            Pulmonary/Chest: Effort normal.   Abdominal: Soft.   Neurological: He is alert and oriented to person, place, and time. No cranial nerve deficit.   Psychiatric: He has a normal mood and affect.             Right Hand/Wrist Exam     Inspection   Scars: Wrist - absent Hand -  absent  Effusion: Wrist - absent Hand -  absent    Pain   Wrist - The patient exhibits pain of the scapholunate/lunate ECU.    Other     Neuorologic Exam    Median Distribution: normal  Ulnar Distribution: normal  Radial Distribution: normal    Comments:  There is a 2 cm right wrist radial volar ganglion cyst with pain at the extremes of motion          Vascular Exam       Capillary Refill  Right Hand: normal capillary refill          Relevant imaging results reviewed and interpreted by me, discussed with the patient and / or  family today radiographs right wrist were normal  Assessment:     Encounter Diagnosis   Name Primary?    Ganglion cyst Yes    Right wrist radial volar wrist    Plan:       The patient was counseled regarding excision ganglion cyst right radial volar wrist.  Risk complications and alternatives were discussed including the risk of infection, anesthetic risk, injury to nerves and vessels, loss of motion, and possible need for additional surgeries were discussed.  He seems to understand and agree to that surgery.  All questions were answered.  A 5% recurrence rate was quoted              Disclaimer: This note was prepared using a voice recognition system and is likely to have sound alike errors within the text.

## 2024-09-05 ENCOUNTER — PATIENT MESSAGE (OUTPATIENT)
Dept: ORTHOPEDICS | Facility: CLINIC | Age: 34
End: 2024-09-05
Payer: COMMERCIAL

## 2024-09-06 DIAGNOSIS — Z01.818 PREOP TESTING: ICD-10-CM

## 2024-09-06 DIAGNOSIS — M67.40 GANGLION CYST: Primary | ICD-10-CM

## 2024-09-13 DIAGNOSIS — Z01.818 PRE-OP EXAM: Primary | ICD-10-CM

## 2024-09-19 ENCOUNTER — PATIENT MESSAGE (OUTPATIENT)
Dept: PREADMISSION TESTING | Facility: HOSPITAL | Age: 34
End: 2024-09-19
Payer: COMMERCIAL

## 2024-09-19 ENCOUNTER — TELEPHONE (OUTPATIENT)
Dept: ORTHOPEDICS | Facility: CLINIC | Age: 34
End: 2024-09-19
Payer: COMMERCIAL

## 2024-09-19 NOTE — TELEPHONE ENCOUNTER
Spoke to the patient an was able to get him moved to 10/18/2024 per the patients request       ----- Message from Mara Hernandez sent at 9/19/2024  1:42 PM CDT -----  Contact: patient  Type:  Appointment Request    Name of Caller:Bebeto Ho   Would the patient rather a call back or a response via [facility]? Call   Best Call Back Number: [phone]   Additional Information:  pt would carlos to know if he can get the post-op appt he has on 10/16 rescheduled to 10/18.

## 2024-09-19 NOTE — TELEPHONE ENCOUNTER
Patient called the office back in regards to their appointment on 10/7/24. Patient wanted to know if Mrs. Linn Koenig PA-C had any other appointment times that day. Informed the patient that on 10/7/24 at 10:00 is the only time that Mrs. Linn Koenig PA-C has that day. Informed the patient that I can not move that appointment to another day due to the fact that Dr. Chavarria wants to see them in clinic 4-5 days after surgery. Patient is still schedule with Mrs. Linn Koenig PA-C on 10/724 at 10:00.verbalized understanding.

## 2024-09-20 ENCOUNTER — OFFICE VISIT (OUTPATIENT)
Dept: DERMATOLOGY | Facility: CLINIC | Age: 34
End: 2024-09-20
Payer: COMMERCIAL

## 2024-09-20 DIAGNOSIS — L81.7 PIGMENTED PURPURIC DERMATOSIS: ICD-10-CM

## 2024-09-20 PROCEDURE — 99999 PR PBB SHADOW E&M-EST. PATIENT-LVL III: CPT | Mod: PBBFAC,,, | Performed by: STUDENT IN AN ORGANIZED HEALTH CARE EDUCATION/TRAINING PROGRAM

## 2024-09-20 NOTE — PROGRESS NOTES
Patient Information  Name: Bebeto Ho  : 1990  MRN: [id number]     Referring Physician:  Dr. Duarte   Primary Care Physician:  Joon Sams MD   Date of Visit: 2024      Subjective:       Bebeto Ho is a 33 y.o. male who presents for   Chief Complaint   Patient presents with    Rash     Rash located on ankle      HPI  Patient is here with concern of: rash  Location: bilateral lower legs  Duration: 6-8 months  Symptoms: red bumps after long day  Prior treatments: none    Patient was last seen:Visit date not found     Prior notes by myself reviewed.   Clinical documentation obtained by nursing staff reviewed.    Review of Systems   Skin:  Positive for rash. Negative for itching.        Objective:    Physical Exam   Constitutional: He appears well-developed and well-nourished. No distress.   Neurological: He is alert and oriented to person, place, and time. He is not disoriented.   Psychiatric: He has a normal mood and affect.   Skin:   Areas Examined (abnormalities noted in diagram):   RLE Inspected  LLE Inspection Performed              Diagram Legend     Erythematous scaling macule/papule c/w actinic keratosis       Vascular papule c/w angioma      Pigmented verrucoid papule/plaque c/w seborrheic keratosis      Yellow umbilicated papule c/w sebaceous hyperplasia      Irregularly shaped tan macule c/w lentigo     1-2 mm smooth white papules consistent with Milia      Movable subcutaneous cyst with punctum c/w epidermal inclusion cyst      Subcutaneous movable cyst c/w pilar cyst      Firm pink to brown papule c/w dermatofibroma      Pedunculated fleshy papule(s) c/w skin tag(s)      Evenly pigmented macule c/w junctional nevus     Mildly variegated pigmented, slightly irregular-bordered macule c/w mildly atypical nevus      Flesh colored to evenly pigmented papule c/w intradermal nevus       Pink pearly papule/plaque c/w basal cell carcinoma      Erythematous hyperkeratotic cursted  plaque c/w SCC      Surgical scar with no sign of skin cancer recurrence      Open and closed comedones      Inflammatory papules and pustules      Verrucoid papule consistent consistent with wart     Erythematous eczematous patches and plaques     Dystrophic onycholytic nail with subungual debris c/w onychomycosis     Umbilicated papule    Erythematous-base heme-crusted tan verrucoid plaque consistent with inflamed seborrheic keratosis     Erythematous Silvery Scaling Plaque c/w Psoriasis     See annotation      No images are attached to the encounter or orders placed in the encounter.    [] Data reviewed  [] Independent review of test  [] Management discussed with another provider    Assessment / Plan:        Pigmented purpuric dermatosis  Reassurance given to patient. No treatment is necessary. Recommend compression stockings and propping legs on pillow at night           LOS NUMBER AND COMPLEXITY OF PROBLEMS    COMPLEXITY OF DATA RISK TOTAL TIME (m)   96496  [id number] [] 1 self-limited or minor problem [x] Minimal to none [x] No treatment recommended or patient to monitor 15-29  10-19   33663  49117 Low  [] 2 or > self limited or minor problems  [x] 1 stable chronic illness  [] 1 acute, uncomplicated illness or injury Limited (2)  [] Prior external notes from each unique source  [] Review result of each unique test  [] Order each unique test []  Low  OTC medications, minor skin biopsy 30-44  20-29   95848  26057 Moderate  []  1 or > chronic illness with progression, exacerbation or SE of treatment  []  2 or more stable chronic illnesses  []  1 acute illness with systemic symptoms  []  1 acute complicated injury  []  1 undiagnosed new problem with uncertain prognosis Moderate (1/3 below)  []  3 or more data items        *Now includes assessment requiring independent historian  []  Independent interpretation of a test  []  Discuss management/test with another provider Moderate  []  Prescription drug mgmt  []  Minor  surgery with risk discussed  []  Mgmt limited by social determinates 45-59  30-39   41468  96997 High  []  1 or more chronic illness with severe exacerbation, progression or SE of treatment  []  1 acute or chronic illness/injury that poses a threat to life or bodily function Extensive (2/3 below)  []  3 or more data items        *Now includes assessment requiring independent historian.  []  Independent interpretation of a test  []  Discuss management/test with another provider High  []  Major surgery with risk discussed  []  Drug therapy requiring intensive monitoring for toxicity  []  Hospitalization  []  Decision for DNR 60-74  40-54      No follow-ups on file.    Leandra Shea MD, FAAD  [facility]

## 2024-09-20 NOTE — PATIENT INSTRUCTIONS
Recommend using a mild, moisturizing soap as Keratosis pilaris is exacerbated by dry skin.    Discussed [place] apricot scrub or other gently exfoliating wash or gentle use of Buf Puf.    OTC keratolytics (Am lactin, Carmol, Ureamide, Kerasal) recommended for daily use after bath or shower.    Other OTC options:  Bare 40 - amazon $18  Gold bond rough and bumpy skin cream  Lakshmi In Shower Wash Off Lotion  Glytone wash and Cerave SA cream  Glytone KP kit - it's 2 steps ([facility]) - SE: sunburn  Differin every evening, am lactin every morning

## 2024-09-27 ENCOUNTER — TELEPHONE (OUTPATIENT)
Dept: PSYCHIATRY | Facility: CLINIC | Age: 34
End: 2024-09-27
Payer: COMMERCIAL

## 2024-09-27 ENCOUNTER — OFFICE VISIT (OUTPATIENT)
Dept: PSYCHIATRY | Facility: CLINIC | Age: 34
End: 2024-09-27
Payer: COMMERCIAL

## 2024-09-27 ENCOUNTER — OFFICE VISIT (OUTPATIENT)
Dept: INTERNAL MEDICINE | Facility: CLINIC | Age: 34
End: 2024-09-27
Payer: COMMERCIAL

## 2024-09-27 ENCOUNTER — PATIENT MESSAGE (OUTPATIENT)
Dept: PREADMISSION TESTING | Facility: HOSPITAL | Age: 34
End: 2024-09-27
Payer: COMMERCIAL

## 2024-09-27 ENCOUNTER — HOSPITAL ENCOUNTER (OUTPATIENT)
Dept: CARDIOLOGY | Facility: HOSPITAL | Age: 34
Discharge: HOME | End: 2024-09-27
Payer: COMMERCIAL

## 2024-09-27 VITALS
DIASTOLIC BLOOD PRESSURE: 106 MMHG | BODY MASS INDEX: 40.49 KG/M2 | TEMPERATURE: 98 F | SYSTOLIC BLOOD PRESSURE: 155 MMHG | WEIGHT: 298.94 LBS | RESPIRATION RATE: 18 BRPM | HEART RATE: 93 BPM | HEIGHT: 72 IN | OXYGEN SATURATION: 99 %

## 2024-09-27 DIAGNOSIS — M67.40 GANGLION CYST: Primary | ICD-10-CM

## 2024-09-27 DIAGNOSIS — Z01.818 PRE-OP EXAM: ICD-10-CM

## 2024-09-27 DIAGNOSIS — G47.00 INSOMNIA, UNSPECIFIED TYPE: ICD-10-CM

## 2024-09-27 DIAGNOSIS — I10 PRIMARY HYPERTENSION: ICD-10-CM

## 2024-09-27 DIAGNOSIS — F41.1 GENERALIZED ANXIETY DISORDER WITH PANIC ATTACKS: Primary | ICD-10-CM

## 2024-09-27 DIAGNOSIS — E66.01 SEVERE OBESITY (BMI >= 40): ICD-10-CM

## 2024-09-27 DIAGNOSIS — G47.33 OSA (OBSTRUCTIVE SLEEP APNEA): ICD-10-CM

## 2024-09-27 DIAGNOSIS — F41.0 GENERALIZED ANXIETY DISORDER WITH PANIC ATTACKS: Primary | ICD-10-CM

## 2024-09-27 LAB
OHS QRS DURATION: 108 MS
OHS QTC CALCULATION: 438 MS

## 2024-09-27 PROCEDURE — 3044F HG A1C LEVEL LT 7.0%: CPT | Mod: CPTII,S$GLB,, | Performed by: SOCIAL WORKER

## 2024-09-27 PROCEDURE — 93005 ELECTROCARDIOGRAM TRACING: CPT

## 2024-09-27 PROCEDURE — 99999 PR PBB SHADOW E&M-EST. PATIENT-LVL II: CPT | Mod: PBBFAC,,, | Performed by: SOCIAL WORKER

## 2024-09-27 PROCEDURE — 93010 ELECTROCARDIOGRAM REPORT: CPT | Mod: ,,, | Performed by: INTERNAL MEDICINE

## 2024-09-27 PROCEDURE — 4010F ACE/ARB THERAPY RXD/TAKEN: CPT | Mod: CPTII,S$GLB,, | Performed by: SOCIAL WORKER

## 2024-09-27 PROCEDURE — 90791 PSYCH DIAGNOSTIC EVALUATION: CPT | Mod: S$GLB,,, | Performed by: SOCIAL WORKER

## 2024-09-27 PROCEDURE — 99999 PR PBB SHADOW E&M-EST. PATIENT-LVL IV: CPT | Mod: PBBFAC,,,

## 2024-09-27 NOTE — ASSESSMENT & PLAN NOTE
-BMI 40.45  -compliance with low-fat/low-sodium diet and increased exercise as tolerated when appropriate encouraged

## 2024-09-27 NOTE — ASSESSMENT & PLAN NOTE
Blood pressure elevated at 155/106 upon assessment in PAT   --bilateral pedal edema also reported-  -Lisinopril continued- dose recently increased from 20 mg to 40 mg   -patient advised to maintain compliance with low-sodium diet and increase exercise as tolerated when appropriate  -patient to follow-up with PCP for further evaluation

## 2024-09-27 NOTE — PROGRESS NOTES
Preoperative History and Physical  [facility]                                                                   Chief Complaint: Preoperative evaluation     History of Present Illness:      Bebeto Ho is a 33 y.o. male who presents to the office today for a preoperative consultation at the request of Dr. Chavarria who plans on performing a EXCISION, GANGLION CYST, WRIST on October 3.     Functional Status:      The patient is able to climb a flight of stairs. The patient is able to ambulate independently without difficulty. The patient's functional status is not affected by the surgical problem. The patient's functional status is not affected by shortness of breath, chest pain, dyspnea on exertion and fatigue.    MET score greater than 4    Patient Anesthesia History:      History of Malignant Hyperthermia: no  History of Pseudocholinesterase Deficiency: no  History of PONV: no  History of difficult intubation: no  History of delayed emergence: no  History of high fever after anesthesia: no    Family Anesthesia History:      History of Malignant Hyperthermia: no  History of Pseudocholinesterase Deficiency: no    Past Medical History:      Past Medical History:   Diagnosis Date    Hypertension     Sleep apnea         Past Surgical History:      Past Surgical History:   Procedure Laterality Date    EAR TUBE REMOVAL      at 6 years of age    VASECTOMY          Social History:      Social History     Socioeconomic History    Marital status:     Number of children: 2   Occupational History    Occupation: teacher   Tobacco Use    Smoking status: Never     Passive exposure: Never    Smokeless tobacco: Never   Substance and Sexual Activity    Alcohol use: Yes     Alcohol/week: 15.0 standard drinks of alcohol     Types: 15 Drinks containing 0.5 oz of alcohol per week    Drug use: Never    Sexual activity: Yes     Partners: Female     Social Drivers of Health      Financial Resource Strain: Low Risk  (6/27/2024)    Overall Financial Resource Strain (CARDIA)     Difficulty of Paying Living Expenses: Not very hard   Food Insecurity: No Food Insecurity (6/27/2024)    Hunger Vital Sign     Worried About Running Out of Food in the Last Year: Never true     Ran Out of Food in the Last Year: Never true   Physical Activity: Unknown (6/27/2024)    Exercise Vital Sign     Days of Exercise per Week: 0 days   Stress: Stress Concern Present (6/27/2024)    [employer] [place] [employer] - Occupational Stress Questionnaire     Feeling of Stress : To some extent   Housing Stability: Unknown (6/27/2024)    Housing Stability Vital Sign     Unable to Pay for Housing in the Last Year: No        Family History:      Family History   Family history unknown: Yes       Allergies:      Review of patient's allergies indicates:   Allergen Reactions    Poison ivy extract Blisters, Itching, Rash and Swelling    Wasp venom Hives and Swelling       Medications:      Current Outpatient Medications   Medication Sig    lisinopriL (PRINIVIL,ZESTRIL) 40 MG tablet Take 1 tablet (40 mg total) by mouth every evening.     No current facility-administered medications for this visit.       Vitals:      Vitals:    09/27/24 1045   BP: (!) 155/106   Pulse: 93   Resp: 18   Temp: 97.8 °F (36.6 °C)       Review of Systems:        Constitutional: Negative for fever, chills, weight loss, malaise/fatigue and diaphoresis.   HENT: Negative for hearing loss, ear pain, nosebleeds, congestion, sore throat, neck pain, tinnitus and ear discharge.    Eyes: Negative for blurred vision, double vision, photophobia, pain, discharge and redness.   Respiratory: Negative for cough, hemoptysis, sputum production, shortness of breath, wheezing and stridor.    Cardiovascular: Negative for chest pain, palpitations, orthopnea, claudication, and PND. + leg swelling  Gastrointestinal: Negative for heartburn, nausea, vomiting,  abdominal pain, diarrhea, constipation, blood in stool and melena.   Genitourinary: Negative for dysuria, urgency, frequency, hematuria and flank pain.   Musculoskeletal: Negative for myalgias, back pain, joint pain and falls. + R wrist pain  Skin: Negative for itching and rash.   Neurological: Negative for dizziness, tingling, tremors, sensory change, speech change, focal weakness, seizures, loss of consciousness, weakness and headaches.   Endo/Heme/Allergies: Negative for environmental allergies and polydipsia. Does not bruise/bleed easily.   Psychiatric/Behavioral: Negative for depression, suicidal ideas, hallucinations, memory loss and substance abuse. The patient is not nervous/anxious and does not have insomnia.    All 14 systems reviewed and negative except as noted above.    Physical Exam:      Constitutional: Appears well-developed, well-nourished and in no acute distress.  Patient is oriented to person, place, and time.   Head: Normocephalic and atraumatic. Mucous membranes moist.  Neck: Neck supple no mass.   Cardiovascular: Normal rate and regular rhythm.  S1 S2 appreciated by ascultation.  Pulmonary/Chest: Effort normal and clear to auscultation bilaterally. No respiratory distress.   Abdomen: Soft. Non-tender and non-distended. Bowel sounds are normal.   Neurological: Patient is alert and oriented to person, place and time. Moves all extremities.  Skin: Warm and dry. No lesions. Cyst to R wrist present   Extremities: No clubbing, cyanosis.  Trace Bilateral pedal edema appreciated on exam    Laboratory data:      Reviewed and noted in plan where applicable. Please see chart for full laboratory data    Lab Results   Component Value Date    WBC 5.58 09/27/2024    HGB 15.1 09/27/2024    HCT 45.1 09/27/2024    MCV 86 09/27/2024     09/27/2024     Comprehensive Metabolic Panel  Order: [id number]  Status: Final result  Dx: Preop testing           Component Ref Range & Units 3 d ago  (9/27/24) 2 mo  ago  (7/26/24) 2 mo ago  (7/5/24)   Sodium 136 - 145 mmol/L 141 140 140   Potassium 3.5 - 5.1 mmol/L 4.8 4.2 4.0   Chloride 95 - 110 mmol/L 105 105 105   CO2 23 - 29 mmol/L 23 25 24   Glucose 70 - 110 mg/dL 87 83 103   BUN 6 - 20 mg/dL 10 11 14   Creatinine 0.5 - 1.4 mg/dL 0.9 1.0 0.9   Calcium 8.7 - 10.5 mg/dL 9.7 9.5 9.3   Total Protein 6.0 - 8.4 g/dL 7.2  6.9   Albumin 3.5 - 5.2 g/dL 4.3  4.0   Total Bilirubin 0.1 - 1.0 mg/dL 0.7  0.7 CM   Comment: For infants and newborns, interpretation of results should be based  on gestational age, weight and in agreement with clinical  observations.    Premature Infant recommended reference ranges:  Up to 24 hours.............<8.0 mg/dL  Up to 48 hours............<12.0 mg/dL  3-5 days..................<15.0 mg/dL  6-29 days.................<15.0 mg/dL   Alkaline Phosphatase 55 - 135 U/L 56  59   AST 10 - 40 U/L 22  24   ALT 10 - 44 U/L 41  51 High    eGFR >60 mL/min/1.73 m^2 >60.0 >60.0 >60.0   Anion Gap 8 - 16 mmol/L 13 10 11   Resulting Agency  [facility]              Narrative  Performed by: [facility]  Send normal result to authorizing provider's In Basket if  patient is active on MyChart:->Yes   Specimen Collected: 09/27/24 11:24 CDT Last Resulted: 09/27/24 22:56 CDT                  Predictors of intubation difficulty:       Morbid obesity? yes - BMI 40.63   Anatomically abnormal facies? no   Prominent incisors? no   Receding mandible? no   Short, thick neck? no   Neck range of motion: normal   Dentition: Chipped teeth present (left front tooth)  Based on the Modified Mallampati, patient is a mallampati score: II (hard and soft palate, upper portion of tonsils anduvula visible)    Cardiographics:      ECG: normal sinus rhythm  Echocardiogram: not indicated    Imaging:      Chest x-ray: not indicated    Assessment and Plan:      1. Ganglion cyst  Assessment & Plan:  EXCISION, GANGLION CYST, WRIST planned per Dr. Chavarria on 10/3/2024    Known risk factors for perioperative  complications: None    Difficulty with intubation is not anticipated.    Cardiac Risk Estimation: Based on the Revised Cardiac Risk index, patient is a Class I risk with a 3.9 % risk of a major cardiac event in a low risk procedure.    1.) Preoperative workup as follows: ECG, hemoglobin, hematocrit, electrolytes, creatinine, glucose.  2.) Change in medication regimen before surgery: discontinue ASA 6 days before surgery, discontinue NSAIDs 5 days before surgery, hold Metformin 24 hours prior to surgery. Hold all vitamins/supplements for 7 days prior to surgery, with the exception of Potassium and Iron supplementation. Hold semaglutide/tirzepatide for 7 days prior to surgery.   3.) Prophylaxis for cardiac events with perioperative beta-blockers: not indicated.  4.) Invasive hemodynamic monitoring perioperatively: at the discretion of anesthesiologist.  5.) Deep vein thrombosis prophylaxis postoperatively: regimen to be chosen by surgical team.  6.) Surveillance for postoperative MI with ECG immediately postoperatively and on postoperati ve days 1 and 2 AND troponin levels 24 hours postoperatively and on day 4 or hospital discharge (whichever comes first): at the discretion of anesthesiologist.  7.) Current medications which may produce withdrawal symptoms if withheld perioperatively: None   8.) Other measures: Postoperative hypertension management with IV hydralazine until able to take oral medications.  Postoperative incentive spirometry to prevent pneumonia.         2. Pre-op exam  -     Ambulatory referral/consult to Pre-Admit  -     EKG 12-lead; Future; Expected date: 09/27/2024    3. QIANA (obstructive sleep apnea)  Assessment & Plan:  -patient reports compliance with CPAP machine nightly   -followed outpatient by pulmonology      4. Primary hypertension  Assessment & Plan:  Blood pressure elevated at 155/106 upon assessment in [facility]   --bilateral pedal edema also reported-  -Lisinopril continued- dose recently  increased from 20 mg to 40 mg   -patient advised to maintain compliance with low-sodium diet and increase exercise as tolerated when appropriate  -patient to follow-up with PCP for further evaluation      5. Severe obesity (BMI >= 40)  Assessment & Plan:  -BMI 40.45  -compliance with low-fat/low-sodium diet and increased exercise as tolerated when appropriate encouraged               Electronically signed by Tracy Barrett NP on 9/30/2024 at 10:49 AM.

## 2024-09-27 NOTE — DISCHARGE INSTRUCTIONS
To confirm, your doctor has instructed you: Surgery is scheduled for 10/3/24.   Pre admit office will call the afternoon prior to surgery between 1PM and 3PM with arrival time.    Surgery will be at [facility],  The address is [address]. [place] [address].      IMPORTANT INSTRUCTIONS!    Do not eat or drink after 12 midnight, including water. Do not smoke or use chewing tobacco after 12 midnight!  OK to brush teeth, but no gum, candy, or mints!      *Take only these medicines with a small swallow of water-morning of surgery*     None         ____ Stop taking all vitamins, herbal supplements, Aspirin, & NSAIDS (Ibuprofen, Advil, Aleve) 7 days prior to surgery, as these can thin your blood.    ____ Weight loss medication, such as Adipex and Phentermine, must be stopped 14 days prior to surgery, no exceptions!    *Diabetic Patients: If you take diabetic or weight loss medication, do NOT take morning of surgery unless instructed by Doctor. Metformin to be stopped 24 hrs prior to surgery. DO NOT take short-acting insulin the day of surgery. Only take HALF of your regular dose of long-acting insulin the night before surgery, unless instructed otherwise. Blood sugars will be checked in pre-op.   ~Ozempic/Mounjaro/Wegovy/Trulicity/Semaglutide injections must be stopped 7 days prior to surgery.     Please notify MD office if you develop an active infection, are prescribed antibiotics by someone other than the surgeon doing your surgery, or visit urgent care/ER.      Bathing Instructions:   The night before surgery and the morning prior to coming to the hospital:    - Shower & rinse your body as usual with anti-bacterial Soap (Dial or Lever 2000)   -Hibiclens (if indicated) use AFTER anti-bacterial soap; 1 packet PM/1 packet in AM on surgical site only   -Do not use hibiclens on your head, face, or genitals.    -Do not wash with anti-bacterial soap after you use the hibiclens.    -Do not shave  surgical site 5-7 days prior to surgery.    -Pubic hair 7 days prior to surgery (OBGYN/Urology only).       Additional Instructions:   __ No makeup, powder, lotions, creams, or body spray to skin   __ No deodorant if you are having: breast procedure, PORT, or upper shoulder surgery!   __ No nail polish or artificial nails       **SURGERY WILL BE CANCELLED IF ARTIFICIAL/NAIL POLISH IS PRESENT!!!**  __ Please remove all piercings and leave all jewelry at home.    **SURGERY WILL BE CANCELLED IF PIERCINGS ARE PRESENT!!!**    __ Dentures, Hearing Aids and Contact Lens need to be removed prior to the start of surgery.    __ Avoid Alcoholic beverages 3 days prior to surgery, as it can thin the blood, unless told otherwise by pre-admit department.  __ Females: may need to give a urine sample the morning of surgery;   **Please ask  for a specimen cup if you need to use the restroom prior to being called into pre-op.**  __ Males: Stop ED medications (Viagra, Cialis) 24 hrs prior to surgery.  __ Wear clean, loose-fitting clothing. Allow for dressings/bandages/surgical equipment   __ You must have transportation, and they MUST stay the entire time.   __  Bring photo ID and insurance information to hospital      [facility] Visitor/Ride Policy:   Only 1 adult allowed (over the age of 18) to accompany you and MUST STAY through the entire length of admission.     -Must have a ride home from a responsible adult that you know and trust.    -Medical Transport, Uber or Lyft can only be used if patient has a responsible adult to accompany them during ride home.    ~Your ride MUST STAY the entire time until you are discharged~    *If you are running late or have questions the morning of surgery, please call the Pre-OP Department @ [phone].       *Please Call [facility] Pre-Admit Department for surgery instruction questions:   [phone]      Payment Questions:               Additional Billing Question Numbers:        [phone] or [phone]

## 2024-09-27 NOTE — ASSESSMENT & PLAN NOTE
EXCISION, GANGLION CYST, WRIST planned per Dr. Chavarria on 10/3/2024    Known risk factors for perioperative complications: None    Difficulty with intubation is not anticipated.    Cardiac Risk Estimation: Based on the Revised Cardiac Risk index, patient is a Class I risk with a 3.9 % risk of a major cardiac event in a low risk procedure.    1.) Preoperative workup as follows: ECG, hemoglobin, hematocrit, electrolytes, creatinine, glucose.  2.) Change in medication regimen before surgery: discontinue ASA 6 days before surgery, discontinue NSAIDs 5 days before surgery, hold Metformin 24 hours prior to surgery. Hold all vitamins/supplements for 7 days prior to surgery, with the exception of Potassium and Iron supplementation. Hold semaglutide/tirzepatide for 7 days prior to surgery.   3.) Prophylaxis for cardiac events with perioperative beta-blockers: not indicated.  4.) Invasive hemodynamic monitoring perioperatively: at the discretion of anesthesiologist.  5.) Deep vein thrombosis prophylaxis postoperatively: regimen to be chosen by surgical team.  6.) Surveillance for postoperative MI with ECG immediately postoperatively and on postoperati ve days 1 and 2 AND troponin levels 24 hours postoperatively and on day 4 or hospital discharge (whichever comes first): at the discretion of anesthesiologist.  7.) Current medications which may produce withdrawal symptoms if withheld perioperatively: None   8.) Other measures: Postoperative hypertension management with IV hydralazine until able to take oral medications.  Postoperative incentive spirometry to prevent pneumonia.

## 2024-10-02 ENCOUNTER — ANESTHESIA EVENT (OUTPATIENT)
Dept: SURGERY | Facility: HOSPITAL | Age: 34
End: 2024-10-02
Payer: COMMERCIAL

## 2024-10-02 ENCOUNTER — PATIENT MESSAGE (OUTPATIENT)
Dept: RESPIRATORY THERAPY | Facility: HOSPITAL | Age: 34
End: 2024-10-02
Payer: COMMERCIAL

## 2024-10-02 NOTE — ANESTHESIA PREPROCEDURE EVALUATION
10/02/2024  Bebeto Ho is a 33 y.o., male.  Past Medical History:   Diagnosis Date    Hypertension     Sleep apnea      Past Surgical History:   Procedure Laterality Date    EAR TUBE REMOVAL      at 6 years of age    VASECTOMY           Pre-op Assessment    I have reviewed the Patient Summary Reports.     I have reviewed the Nursing Notes. I have reviewed the NPO Status.   I have reviewed the Medications.     Review of Systems  Anesthesia Hx:  No problems with previous Anesthesia   History of prior surgery of interest to airway management or planning:  Previous anesthesia: General        Denies Family Hx of Anesthesia complications.    Denies Personal Hx of Anesthesia complications.                    Social:  Non-Smoker       Hematology/Oncology:  Hematology Normal                                     Cardiovascular:     Hypertension                                        Pulmonary:        Sleep Apnea           Education provided regarding risk of obstructive sleep apnea            Renal/:  Renal/ Normal                 Hepatic/GI:  Hepatic/GI Normal                 Neurological:  Neurology Normal                                      Endocrine:        Morbid Obesity / BMI > 40  Psych:  Psychiatric Normal                    Physical Exam  General: Alert and Oriented    Airway:  Mallampati: II   Mouth Opening: Normal  TM Distance: Normal  Tongue: Normal  Neck ROM: Normal ROM  Neck: Girth Increased    Dental:  Intact    Chest/Lungs:  Clear to auscultation, Normal Respiratory Rate    Heart:  Rate: Normal  Rhythm: Regular Rhythm        Anesthesia Plan  Type of Anesthesia, risks & benefits discussed:    Anesthesia Type: MAC, Gen Natural Airway, Gen Supraglottic Airway  Intra-op Monitoring Plan: Standard ASA Monitors  Post Op Pain Control Plan: multimodal analgesia and IV/PO Opioids PRN  Induction:   IV  Informed Consent: Informed consent signed with the Patient and all parties understand the risks and agree with anesthesia plan.  All questions answered.   ASA Score: 3  Day of Surgery Review of History & Physical: H&P Update referred to the surgeon/provider.    Ready For Surgery From Anesthesia Perspective.     .

## 2024-10-03 ENCOUNTER — HOSPITAL ENCOUNTER (OUTPATIENT)
Facility: HOSPITAL | Age: 34
Discharge: HOME | End: 2024-10-03
Attending: ORTHOPAEDIC SURGERY | Admitting: ORTHOPAEDIC SURGERY
Payer: COMMERCIAL

## 2024-10-03 ENCOUNTER — ANESTHESIA (OUTPATIENT)
Dept: SURGERY | Facility: HOSPITAL | Age: 34
End: 2024-10-03
Payer: COMMERCIAL

## 2024-10-03 VITALS
SYSTOLIC BLOOD PRESSURE: 126 MMHG | HEIGHT: 72 IN | TEMPERATURE: 98 F | BODY MASS INDEX: 39.88 KG/M2 | HEART RATE: 69 BPM | WEIGHT: 294.44 LBS | DIASTOLIC BLOOD PRESSURE: 82 MMHG | OXYGEN SATURATION: 100 % | RESPIRATION RATE: 19 BRPM

## 2024-10-03 DIAGNOSIS — M67.439 GANGLION CYST OF WRIST: ICD-10-CM

## 2024-10-03 DIAGNOSIS — M67.431 GANGLION OF RIGHT WRIST: Primary | ICD-10-CM

## 2024-10-03 PROCEDURE — 88304 TISSUE EXAM BY PATHOLOGIST: CPT | Performed by: STUDENT IN AN ORGANIZED HEALTH CARE EDUCATION/TRAINING PROGRAM

## 2024-10-03 PROCEDURE — 88304 TISSUE EXAM BY PATHOLOGIST: CPT | Mod: 26,,, | Performed by: STUDENT IN AN ORGANIZED HEALTH CARE EDUCATION/TRAINING PROGRAM

## 2024-10-03 PROCEDURE — 63600175 PHARM REV CODE 636 W HCPCS: Performed by: NURSE ANESTHETIST, CERTIFIED REGISTERED

## 2024-10-03 PROCEDURE — 71000015 HC POSTOP RECOV 1ST HR: Performed by: ORTHOPAEDIC SURGERY

## 2024-10-03 PROCEDURE — 37000009 HC ANESTHESIA EA ADD 15 MINS: Performed by: ORTHOPAEDIC SURGERY

## 2024-10-03 PROCEDURE — 36000706: Performed by: ORTHOPAEDIC SURGERY

## 2024-10-03 PROCEDURE — 36000707: Performed by: ORTHOPAEDIC SURGERY

## 2024-10-03 PROCEDURE — 63600175 PHARM REV CODE 636 W HCPCS: Performed by: ORTHOPAEDIC SURGERY

## 2024-10-03 PROCEDURE — 37000008 HC ANESTHESIA 1ST 15 MINUTES: Performed by: ORTHOPAEDIC SURGERY

## 2024-10-03 PROCEDURE — 25111 REMOVE WRIST TENDON LESION: CPT | Mod: RT,,, | Performed by: ORTHOPAEDIC SURGERY

## 2024-10-03 PROCEDURE — 71000033 HC RECOVERY, INTIAL HOUR: Performed by: ORTHOPAEDIC SURGERY

## 2024-10-03 RX ORDER — MIDAZOLAM HYDROCHLORIDE 1 MG/ML
INJECTION INTRAMUSCULAR; INTRAVENOUS
Status: DISCONTINUED | OUTPATIENT
Start: 2024-10-03 | End: 2024-10-03

## 2024-10-03 RX ORDER — SODIUM CHLORIDE, SODIUM LACTATE, POTASSIUM CHLORIDE, CALCIUM CHLORIDE 600; 310; 30; 20 MG/100ML; MG/100ML; MG/100ML; MG/100ML
INJECTION, SOLUTION INTRAVENOUS CONTINUOUS PRN
Status: DISCONTINUED | OUTPATIENT
Start: 2024-10-03 | End: 2024-10-03

## 2024-10-03 RX ORDER — LIDOCAINE HYDROCHLORIDE 20 MG/ML
INJECTION INTRAVENOUS
Status: DISCONTINUED | OUTPATIENT
Start: 2024-10-03 | End: 2024-10-03

## 2024-10-03 RX ORDER — CHLORHEXIDINE GLUCONATE ORAL RINSE 1.2 MG/ML
10 SOLUTION DENTAL
Status: DISCONTINUED | OUTPATIENT
Start: 2024-10-03 | End: 2024-10-03 | Stop reason: HOSPADM

## 2024-10-03 RX ORDER — CHLORHEXIDINE GLUCONATE ORAL RINSE 1.2 MG/ML
10 SOLUTION DENTAL 2 TIMES DAILY
Status: DISCONTINUED | OUTPATIENT
Start: 2024-10-03 | End: 2024-10-03 | Stop reason: HOSPADM

## 2024-10-03 RX ORDER — SODIUM CHLORIDE, SODIUM LACTATE, POTASSIUM CHLORIDE, CALCIUM CHLORIDE 600; 310; 30; 20 MG/100ML; MG/100ML; MG/100ML; MG/100ML
INJECTION, SOLUTION INTRAVENOUS CONTINUOUS
Status: DISCONTINUED | OUTPATIENT
Start: 2024-10-03 | End: 2024-10-03 | Stop reason: HOSPADM

## 2024-10-03 RX ORDER — HYDROCODONE BITARTRATE AND ACETAMINOPHEN 5; 325 MG/1; MG/1
1 TABLET ORAL EVERY 6 HOURS PRN
Qty: 15 TABLET | Refills: 0 | Status: SHIPPED | OUTPATIENT
Start: 2024-10-03

## 2024-10-03 RX ORDER — HYDROCODONE BITARTRATE AND ACETAMINOPHEN 5; 325 MG/1; MG/1
1 TABLET ORAL
Status: DISCONTINUED | OUTPATIENT
Start: 2024-10-03 | End: 2024-10-03 | Stop reason: HOSPADM

## 2024-10-03 RX ORDER — CEFAZOLIN SODIUM 1 G/3ML
INJECTION, POWDER, FOR SOLUTION INTRAMUSCULAR; INTRAVENOUS
Status: DISCONTINUED | OUTPATIENT
Start: 2024-10-03 | End: 2024-10-03

## 2024-10-03 RX ORDER — PROPOFOL 10 MG/ML
INJECTION, EMULSION INTRAVENOUS
Status: DISCONTINUED | OUTPATIENT
Start: 2024-10-03 | End: 2024-10-03

## 2024-10-03 RX ORDER — ALBUTEROL SULFATE 0.83 MG/ML
2.5 SOLUTION RESPIRATORY (INHALATION) EVERY 4 HOURS PRN
Status: DISCONTINUED | OUTPATIENT
Start: 2024-10-03 | End: 2024-10-03 | Stop reason: HOSPADM

## 2024-10-03 RX ORDER — LIDOCAINE HYDROCHLORIDE 10 MG/ML
INJECTION, SOLUTION EPIDURAL; INFILTRATION; INTRACAUDAL; PERINEURAL
Status: DISCONTINUED
Start: 2024-10-03 | End: 2024-10-03 | Stop reason: WASHOUT

## 2024-10-03 RX ORDER — GLUCAGON 1 MG
1 KIT INJECTION
Status: DISCONTINUED | OUTPATIENT
Start: 2024-10-03 | End: 2024-10-03 | Stop reason: HOSPADM

## 2024-10-03 RX ORDER — ONDANSETRON HYDROCHLORIDE 2 MG/ML
4 INJECTION, SOLUTION INTRAVENOUS ONCE AS NEEDED
Status: DISCONTINUED | OUTPATIENT
Start: 2024-10-03 | End: 2024-10-03 | Stop reason: HOSPADM

## 2024-10-03 RX ORDER — BUPIVACAINE HYDROCHLORIDE 2.5 MG/ML
INJECTION, SOLUTION EPIDURAL; INFILTRATION; INTRACAUDAL
Status: DISCONTINUED
Start: 2024-10-03 | End: 2024-10-03 | Stop reason: WASHOUT

## 2024-10-03 RX ORDER — MEPERIDINE HYDROCHLORIDE 25 MG/ML
12.5 INJECTION INTRAMUSCULAR; INTRAVENOUS; SUBCUTANEOUS ONCE
Status: DISCONTINUED | OUTPATIENT
Start: 2024-10-03 | End: 2024-10-03 | Stop reason: HOSPADM

## 2024-10-03 RX ORDER — ONDANSETRON HYDROCHLORIDE 2 MG/ML
INJECTION, SOLUTION INTRAVENOUS
Status: DISCONTINUED | OUTPATIENT
Start: 2024-10-03 | End: 2024-10-03

## 2024-10-03 RX ORDER — DIPHENHYDRAMINE HYDROCHLORIDE 50 MG/ML
25 INJECTION INTRAMUSCULAR; INTRAVENOUS EVERY 6 HOURS PRN
Status: DISCONTINUED | OUTPATIENT
Start: 2024-10-03 | End: 2024-10-03 | Stop reason: HOSPADM

## 2024-10-03 RX ORDER — HYDROCODONE BITARTRATE AND ACETAMINOPHEN 5; 325 MG/1; MG/1
1 TABLET ORAL EVERY 4 HOURS PRN
Status: DISCONTINUED | OUTPATIENT
Start: 2024-10-03 | End: 2024-10-03 | Stop reason: HOSPADM

## 2024-10-03 RX ORDER — SODIUM CHLORIDE 0.9 % (FLUSH) 0.9 %
10 SYRINGE (ML) INJECTION
Status: DISCONTINUED | OUTPATIENT
Start: 2024-10-03 | End: 2024-10-03 | Stop reason: HOSPADM

## 2024-10-03 RX ORDER — FENTANYL CITRATE 50 UG/ML
INJECTION, SOLUTION INTRAMUSCULAR; INTRAVENOUS
Status: DISCONTINUED | OUTPATIENT
Start: 2024-10-03 | End: 2024-10-03

## 2024-10-03 RX ORDER — LIDOCAINE HYDROCHLORIDE 20 MG/ML
INJECTION, SOLUTION INFILTRATION; PERINEURAL
Status: DISCONTINUED
Start: 2024-10-03 | End: 2024-10-03 | Stop reason: HOSPADM

## 2024-10-03 RX ORDER — FENTANYL CITRATE 50 UG/ML
25 INJECTION, SOLUTION INTRAMUSCULAR; INTRAVENOUS EVERY 5 MIN PRN
Status: DISCONTINUED | OUTPATIENT
Start: 2024-10-03 | End: 2024-10-03 | Stop reason: HOSPADM

## 2024-10-03 RX ORDER — LIDOCAINE HYDROCHLORIDE 20 MG/ML
INJECTION, SOLUTION EPIDURAL; INFILTRATION; INTRACAUDAL; PERINEURAL
Status: DISCONTINUED | OUTPATIENT
Start: 2024-10-03 | End: 2024-10-03 | Stop reason: HOSPADM

## 2024-10-03 RX ORDER — DEXMEDETOMIDINE HYDROCHLORIDE 100 UG/ML
INJECTION, SOLUTION INTRAVENOUS
Status: DISCONTINUED | OUTPATIENT
Start: 2024-10-03 | End: 2024-10-03

## 2024-10-03 RX ADMIN — PROPOFOL 20 MG: 10 INJECTION, EMULSION INTRAVENOUS at 07:10

## 2024-10-03 RX ADMIN — DEXMEDETOMIDINE HYDROCHLORIDE 4 MCG: 100 INJECTION, SOLUTION INTRAVENOUS at 07:10

## 2024-10-03 RX ADMIN — PROPOFOL 50 MG: 10 INJECTION, EMULSION INTRAVENOUS at 07:10

## 2024-10-03 RX ADMIN — ONDANSETRON 4 MG: 2 INJECTION INTRAMUSCULAR; INTRAVENOUS at 07:10

## 2024-10-03 RX ADMIN — FENTANYL CITRATE 50 MCG: 50 INJECTION, SOLUTION INTRAMUSCULAR; INTRAVENOUS at 07:10

## 2024-10-03 RX ADMIN — MIDAZOLAM 2 MG: 1 INJECTION INTRAMUSCULAR; INTRAVENOUS at 06:10

## 2024-10-03 RX ADMIN — SODIUM CHLORIDE, POTASSIUM CHLORIDE, SODIUM LACTATE AND CALCIUM CHLORIDE: 600; 310; 30; 20 INJECTION, SOLUTION INTRAVENOUS at 07:10

## 2024-10-03 RX ADMIN — LIDOCAINE HYDROCHLORIDE 100 MG: 20 INJECTION INTRAVENOUS at 07:10

## 2024-10-03 RX ADMIN — CEFAZOLIN 2 G: 330 INJECTION, POWDER, FOR SOLUTION INTRAMUSCULAR; INTRAVENOUS at 07:10

## 2024-10-03 NOTE — DISCHARGE SUMMARY
[facility]  Discharge Note  Short Stay    Procedure(s) (LRB):  EXCISION, GANGLION CYST, WRIST (Right) radial volar wrist      OUTCOME: Patient tolerated treatment/procedure well without complication and is now ready for discharge.    DISPOSITION: Home or Self Care    FINAL DIAGNOSIS:  Right wrist radial volar ganglion cyst    FOLLOWUP: In clinic    DISCHARGE INSTRUCTIONS:    Discharge Procedure Orders   Diet general     Call MD for:  temperature >100.4     Call MD for:  persistent nausea and vomiting     Call MD for:  severe uncontrolled pain     Call MD for:  difficulty breathing, headache or visual disturbances     Call MD for:  redness, tenderness, or signs of infection (pain, swelling, redness, odor or green/yellow discharge around incision site)     Call MD for:  hives     Call MD for:  persistent dizziness or light-headedness     Call MD for:  extreme fatigue      20  TIME SPENT ON DISCHARGE:  20 minutes

## 2024-10-03 NOTE — DISCHARGE INSTRUCTIONS
After Hand Surgery  After surgery, the better you take care of yourself--especially your hand--the sooner it will heal. Follow your surgeons instructions. Try not to bump your hand, and dont move or lift anything while youre still wearing bandages, a splint, or a cast.  Care for your hand    Keep your hand elevated above heart level as much as possible for the first several days after surgery. This helps reduce swelling and pain.  To help prevent infection and speed healing, take care not to get your cast or bandages wet.  Keep dressing clean, dry, & intact until your follow up appointment.   Relieve pain as directed  Your surgeon may prescribe pain medicine or suggest you take an anti-inflammatory medicine. You might also be instructed to apply ice (or another cold source) to your hand. If you use ice cubes, put them in a plastic bag and rest it on top of your bandages. Leave the cold source on your hand for as long as its comfortable. Do this several times a day for the first few days after surgery. It may take several minutes before you can feel the cold through the cast or bandages.  Follow up with your surgeon  During a follow-up visit after surgery, your surgeon will check your progress. The stitches, bandages, splint, or cast may be removed. A new cast or splint may be placed. If your hand has healed enough, your surgeon may prescribe exercises.  Do prescribed hand exercises  Your surgeon may recommend that you do exercises. These may be done under the guidance of a physical or occupational therapist. The exercises strengthen your hand, help you regain flexibility, and restore proper function. Do the exercises as advised.  Call your surgeon if you have...  A fever higher than 100.4°F (38.0°C) taken by mouth  Side effects from your medicine, such as prolonged nausea  A wet or loose dressing, or a dressing that is too tight  Excessive bleeding  Increased, ongoing pain or numbness  Signs of infection (such  as drainage, warmth, or redness) at the incision site   Date Last Reviewed: 11/11/2015  © 5354-9695 [employer]. [address] [place] [address], [place] [address]. All rights reserved. This information is not intended as a substitute for professional medical care. Always follow your healthcare professional's instructions.

## 2024-10-03 NOTE — OP NOTE
[facility]  General Surgery  Operative Note    SUMMARY     Date of Procedure: 10/3/2024     Procedure: Procedure(s) (LRB):  EXCISION, GANGLION CYST, WRIST (Right)   radial volar wrist    Surgeons and Role:     * Kenneth Chavarria MD - Primary    Assisting Surgeon: None    Pre-Operative Diagnosis: Ganglion cyst [M67.40] right wrist radial volar cyst    Post-Operative Diagnosis: Post-Op Diagnosis Codes:     * Ganglion cyst [M67.40] right wrist radial volar wrist    Anesthesia:  Local plus sedation    Description:  The patient was taken to the operating room where 10 cc of 2% plain lidocaine use local anesthetic about the radial volar aspect right wrist.  Satisfactory anesthesia had been achieved the right hand was prepped and draped in the usual sterile fashion.  After exsanguination of the extremity a proximal tourniquet was inflated to 250 mmHg after patient received 2 g of Ancef intravenously preoperatively.  At this time a v-shaped incision was made over the right wrist radial volar ganglion cyst.  The skin flap was elevated.  Cutaneous neurovascular structures were identified and carefully retracted.  The radial artery was identified and retracted radially.  The ganglion cyst was excised and submitted pathology for examination.  It was about 2 cm in diameter.  Typical ganglion fluid was identified.  A 3 mm window was made in the radial volar capsule.  The tourniquet was deflated.  Hemostasis achieved using pressure and electrocautery.  Subcutaneous tissue was closed using 4-0 Vicryl suture.  Skin was closed using horizontal mattress 4-0 nylon suture.  Xeroform gauze 4x4s and 2 ABD pads overwrapped with a 3 in gauze dressing.  The patient tolerated the procedure well was transferred to the recovery room in satisfactory condition.    Significant Surgical Tasks Conducted by the Assistant(s), if Applicable:  No assistant    Complications:  None     Estimated Blood Loss (EBL):  5 mL            Implants: None    Specimens:  Right wrist radial volar ganglion cyst           Condition: Good    Disposition: PACU - hemodynamically stable.    Attestation: I was present and scrubbed for the entire procedure.

## 2024-10-03 NOTE — TRANSFER OF CARE
Anesthesia Transfer of Care Note    Patient: Bebeto Ho    Procedure(s) Performed: Procedure(s) (LRB):  EXCISION, GANGLION CYST, WRIST (Right)    Patient location: PACU    Anesthesia Type: other: and general TIVA    Transport from OR: Transported from OR on room air with adequate spontaneous ventilation    Post pain: adequate analgesia    Post assessment: no apparent anesthetic complications    Post vital signs: stable    Level of consciousness: alert    Nausea/Vomiting: no nausea/vomiting    Complications: none    Transfer of care protocol was followed      Last vitals: Visit Vitals  /78 (BP Location: Left arm, Patient Position: Lying)   Pulse 80   Temp 36.7 °C (98.1 °F) (Temporal)   Resp 17   Ht 6' (1.829 m)   Wt 133.6 kg (294 lb 6.8 oz)   SpO2 96%   BMI 39.93 kg/m²

## 2024-10-03 NOTE — ANESTHESIA POSTPROCEDURE EVALUATION
Anesthesia Post Evaluation    Patient: Bebeto Ho    Procedure(s) Performed: Procedure(s) (LRB):  EXCISION, GANGLION CYST, WRIST (Right)    Final Anesthesia Type: general      Patient location during evaluation: PACU  Patient participation: Yes- Able to Participate  Level of consciousness: awake and alert and oriented  Post-procedure vital signs: reviewed and stable  Pain management: adequate  Airway patency: patent    PONV status at discharge: No PONV  Anesthetic complications: no      Cardiovascular status: blood pressure returned to baseline, stable and hemodynamically stable  Respiratory status: unassisted  Hydration status: euvolemic  Follow-up not needed.              Vitals Value Taken Time   /82 10/03/24 0835   Temp 36.7 °C (98.1 °F) 10/03/24 0835   Pulse 69 10/03/24 0835   Resp 19 10/03/24 0835   SpO2 100 % 10/03/24 0835         Event Time   Out of Recovery 08:16:00         Pain/Tommie Score: Tommie Score: 10 (10/3/2024  8:35 AM)

## 2024-10-03 NOTE — H&P
Subjective:     Patient ID: Bebeto Ho is a 33 y.o. male.    Chief Complaint: No chief complaint on file.      HPI:  The patient is a 33-year-old male with a right wrist radial volar ganglion cyst increases in size that he wishes to have excised    Past Medical History:   Diagnosis Date    Hypertension     Sleep apnea      Past Surgical History:   Procedure Laterality Date    EAR TUBE REMOVAL      at 6 years of age    VASECTOMY       Family History   Family history unknown: Yes     Social History     Socioeconomic History    Marital status:     Number of children: 2   Occupational History    Occupation: teacher   Tobacco Use    Smoking status: Never     Passive exposure: Never    Smokeless tobacco: Never   Substance and Sexual Activity    Alcohol use: Yes     Alcohol/week: 15.0 standard drinks of alcohol     Types: 15 Drinks containing 0.5 oz of alcohol per week    Drug use: Never    Sexual activity: Yes     Partners: Female     Social Drivers of Health     Financial Resource Strain: Low Risk  (6/27/2024)    Overall Financial Resource Strain (CARDIA)     Difficulty of Paying Living Expenses: Not very hard   Food Insecurity: No Food Insecurity (6/27/2024)    Hunger Vital Sign     Worried About Running Out of Food in the Last Year: Never true     Ran Out of Food in the Last Year: Never true   Physical Activity: Unknown (6/27/2024)    Exercise Vital Sign     Days of Exercise per Week: 0 days   Stress: Stress Concern Present (6/27/2024)    [employer] - Occupational Stress Questionnaire     Feeling of Stress : To some extent   Housing Stability: Unknown (6/27/2024)    Housing Stability Vital Sign     Unable to Pay for Housing in the Last Year: No     Current Discharge Medication List        CONTINUE these medications which have NOT CHANGED    Details   lisinopriL (PRINIVIL,ZESTRIL) 40 MG tablet Take 1 tablet (40 mg total) by mouth every evening.  Qty: 90 tablet, Refills: 1     Comments: .  Associated Diagnoses: Primary hypertension           Review of patient's allergies indicates:   Allergen Reactions    Poison ivy extract Blisters, Itching, Rash and Swelling    Wasp venom Hives and Swelling     Review of Systems   Constitutional: Negative for malaise/fatigue.   HENT:  Negative for hearing loss.    Eyes:  Negative for double vision and visual disturbance.   Cardiovascular:  Negative for chest pain.   Respiratory:  Negative for shortness of breath.    Endocrine: Negative for cold intolerance.   Hematologic/Lymphatic: Does not bruise/bleed easily.   Skin:  Negative for poor wound healing and suspicious lesions.   Musculoskeletal:  Negative for gout, joint pain and joint swelling.   Gastrointestinal:  Negative for nausea and vomiting.   Genitourinary:  Negative for dysuria.   Neurological:  Negative for numbness, paresthesias and sensory change.   Psychiatric/Behavioral:  Negative for depression, memory loss and substance abuse. The patient is not nervous/anxious.    Allergic/Immunologic: Negative for persistent infections.       Objective:   Body mass index is 39.93 kg/m².  Vitals:    10/03/24 0602   BP: (!) 139/95   Pulse: 87   Temp: 97.7 °F (36.5 °C)                General    Constitutional: He is oriented to person, place, and time. He appears well-developed and well-nourished. No distress.   HENT:   Head: Normocephalic. Mouth/Throat: Oropharynx is clear and moist.   Eyes: EOM are normal.   Cardiovascular:  Normal rate.            Pulmonary/Chest: Effort normal.   Abdominal: Soft.   Neurological: He is alert and oriented to person, place, and time. No cranial nerve deficit.   Psychiatric: He has a normal mood and affect.             Right Hand/Wrist Exam     Inspection   Scars: Wrist - absent Hand -  absent  Effusion: Wrist - absent Hand -  absent    Pain   Wrist - The patient exhibits pain of the scapholunate/lunate ECU.    Other     Neuorologic Exam    Median Distribution:  normal  Ulnar Distribution: normal  Radial Distribution: normal    Comments:  There is a 2 cm right wrist radial volar ganglion cyst with pain at the extremes of motion          Vascular Exam       Capillary Refill  Right Hand: normal capillary refill          Relevant imaging results reviewed and interpreted by me, discussed with the patient and / or family today radiographs right wrist were normal  Assessment:     Encounter Diagnosis   Name Primary?    Ganglion cyst Yes    Right wrist radial volar wrist    Plan:       The patient was counseled regarding excision ganglion cyst right radial volar wrist.  Risk complications and alternatives were discussed including the risk of infection, anesthetic risk, injury to nerves and vessels, loss of motion, and possible need for additional surgeries were discussed.  He seems to understand and agree to that surgery.  All questions were answered.  A 5% recurrence rate was quoted              Disclaimer: This note was prepared using a voice recognition system and is likely to have sound alike errors within the text.

## 2024-10-04 LAB
FINAL PATHOLOGIC DIAGNOSIS: NORMAL
GROSS: NORMAL
Lab: NORMAL

## 2024-10-06 NOTE — PROGRESS NOTES
"Psychiatry Initial Visit (PhD/LCSW)  Diagnostic Interview - CPT 24566    Date: 9/27/2024    Site: [place]    Referral source: Dr. Joon Duarte    Clinical status of patient: Outpatient    Bebeto Ho, a 33 y.o. male, for initial evaluation visit.  Met with patient.    Chief complaint/reason for encounter: anxiety and sleep, exacerbated by situational factors and sleep apnea    History of present illness:  late entry for 9/27/24. 33 year old male patient presented as referral from primary care physician Joon Duarte MD with chief complained of anxiety over a few years at least, but possibly back to childhood, as he described a stressful childhood. Reported his first known panic attack in 2023 while checking on his attic. Said he bought a house in 2022, and now he worries every time it rains. Endorsed life experience of financially struggling family of origin and continued financial struggle on his own. Described his step-mother from age 12 as the most stable adult in his childhood, but ultimately she and his father  when he was still just 15, so she left his daily life at that point. Patient denied any significant depression; denied any suicidal or homicidal ideation, mood swings, psychosis, cognitive deficit, or substance abuse problems, though he then admits to a steady alcohol consumption rate of 2 drinks a day, daily. Denied any intoxication or discernable consequence of that alcohol use.     Pain: noncontributory    Symptoms:   Mood: depressed mood and insomnia  Anxiety: excessive anxiety/worry and panic attacks  Substance abuse: denied  Cognitive functioning: denied  Health behaviors: noncontributory    Psychiatric history: none    Medical history: hypertension, sleep apnea    Family history of psychiatric illness: described mother as "not stable" but no identified specific mental health issues. Younger full-blood brother on the autism spectrum.     Social history (marriage, employment, " "etc.):  Born in [place], [place], part of childhood in [facility]. Oldest of 3 brothers. Middle brother a year and a half younger is on the autism spectrum. Youngest brother, 5 years younger, shares only the same father. They grew up together. Patient aware of another half-brother, also by mother, 10 years younger, did not grow up with the patient, as patient's parents had  by that time, and he was primarily with his father. Step-mother on the scen from patient's age 12 until that partnership ended when he was 15. Patient relates to youngest brother fondly, despite not growing up together. Call him stable and "a good shirlene" despite the destabilizing influence of their mother. Patient had a masters degree in math from [employer]; attended [employer].  since 2011. Father of 2, an 8 year old boy with ADHD and high functioning autism, and a 6 year old girl, who was born preemie but is healthy now. Wife is Demetrice, a  by [employer].  Not raised with active Judaism, patient said he found and joined an Orthodox group as an adult but felt burned by the intense experience and now identifies as spiritually agnostic.     Substance use:   Alcohol:  steady pattern of 2 drinks a day. Denies intoxication or discernable negative consequences   Drugs: none   Tobacco: none   Caffeine: 1 to 2 cups daily.     Current medications and drug reactions (include OTC, herbal): see medication list      Strengths and liabilities: Strength: Patient accepts guidance/feedback, Strength: Patient is expressive/articulate., Strength: Patient is intelligent., Strength: Patient is motivated for change., Strength: Patient is physically healthy.    Current Evaluation:     Mental Status Exam:  General Appearance:  unremarkable, age appropriate, casually dressed   Speech: normal tone, normal rate, normal pitch, normal volume      Level of Cooperation: cooperative      Thought Processes: normal and logical, " goal-directed   Mood: anxious, depressed      Thought Content: normal, no suicidality, no homicidality, delusions, or paranoia   Affect: congruent and appropriate   Orientation: Oriented x3   Memory: Recent and remote memory intact   Attention Span & Concentration: intact   Fund of General Knowledge: intact and appropriate to age and level of education   Abstract Reasoning: Not formally assessed   Judgment & Insight: fair     Language  intact     Diagnostic Impression - Plan:       ICD-10-CM ICD-9-CM   1. Generalized anxiety disorder with panic attacks  F41.1 300.02    F41.0 300.01   2. Insomnia, unspecified type  G47.00 780.52       Plan:individual psychotherapy    Return to Clinic: as scheduled, 11/22/24    Length of Service (minutes):  52

## 2024-10-07 ENCOUNTER — TELEPHONE (OUTPATIENT)
Dept: PSYCHIATRY | Facility: CLINIC | Age: 34
End: 2024-10-07
Payer: COMMERCIAL

## 2024-10-07 ENCOUNTER — PATIENT MESSAGE (OUTPATIENT)
Dept: PSYCHIATRY | Facility: CLINIC | Age: 34
End: 2024-10-07
Payer: COMMERCIAL

## 2024-10-07 ENCOUNTER — OFFICE VISIT (OUTPATIENT)
Dept: ORTHOPEDICS | Facility: CLINIC | Age: 34
End: 2024-10-07
Payer: COMMERCIAL

## 2024-10-07 VITALS — HEIGHT: 72 IN | WEIGHT: 294.56 LBS | BODY MASS INDEX: 39.9 KG/M2

## 2024-10-07 DIAGNOSIS — Z98.890 S/P EXCISION OF GANGLION CYST: Primary | ICD-10-CM

## 2024-10-07 PROCEDURE — 99999 PR PBB SHADOW E&M-EST. PATIENT-LVL III: CPT | Mod: PBBFAC,,, | Performed by: PHYSICIAN ASSISTANT

## 2024-10-07 PROCEDURE — 3044F HG A1C LEVEL LT 7.0%: CPT | Mod: CPTII,S$GLB,, | Performed by: PHYSICIAN ASSISTANT

## 2024-10-07 PROCEDURE — 1159F MED LIST DOCD IN RCRD: CPT | Mod: CPTII,S$GLB,, | Performed by: PHYSICIAN ASSISTANT

## 2024-10-07 PROCEDURE — 1160F RVW MEDS BY RX/DR IN RCRD: CPT | Mod: CPTII,S$GLB,, | Performed by: PHYSICIAN ASSISTANT

## 2024-10-07 PROCEDURE — 4010F ACE/ARB THERAPY RXD/TAKEN: CPT | Mod: CPTII,S$GLB,, | Performed by: PHYSICIAN ASSISTANT

## 2024-10-07 PROCEDURE — 99024 POSTOP FOLLOW-UP VISIT: CPT | Mod: S$GLB,,, | Performed by: PHYSICIAN ASSISTANT

## 2024-10-07 NOTE — PROGRESS NOTES
Patient ID: Bebeto Ho is a 33 y.o. male.    Chief Complaint: Pain and Post-op Evaluation of the Right Wrist      HPI: Bebeto Ho  is a 33 y.o. male who c/o Pain and Post-op Evaluation of the Right Wrist     Post op visit 1  Pain is at worse a 2/10   The patient is doing well since surgery he is pleased with the results eager to make more advancement as he is right-handed   He stopped taking the pain medication roughly Saturday he does have more should he need   He has been compliant with postop instructions and keeping the extremity dry    Patient is presently denying any shortness of breath, chest pain, fever/chills, nausea/vomiting, loss of taste or smell, numbness/tingling or sensation changes, loss of bladder or bowel function.    Procedure: Right radial volar wrist ganglion cyst excision  DOS:  10/03/2024    Past Medical History:   Diagnosis Date    Hypertension     Sleep apnea      Past Surgical History:   Procedure Laterality Date    EAR TUBE REMOVAL      at 6 years of age    EXCISION OF GANGLION OF WRIST Right 10/3/2024    Procedure: EXCISION, GANGLION CYST, WRIST;  Surgeon: Kenneth Chavarria MD;  Location: [facility];  Service: Orthopedics;  Laterality: Right;  excision ganglion cyst right radial volar wrist    VASECTOMY       Family History   Family history unknown: Yes     Social History     Socioeconomic History    Marital status:     Number of children: 2   Occupational History    Occupation: teacher   Tobacco Use    Smoking status: Never     Passive exposure: Never    Smokeless tobacco: Never   Substance and Sexual Activity    Alcohol use: Yes     Alcohol/week: 15.0 standard drinks of alcohol     Types: 15 Drinks containing 0.5 oz of alcohol per week    Drug use: Never    Sexual activity: Yes     Partners: Female     Social Drivers of Health     Financial Resource Strain: Low Risk  (6/27/2024)    Overall Financial Resource Strain (CARDIA)     Difficulty of Paying Living Expenses:  Not very hard   Food Insecurity: No Food Insecurity (6/27/2024)    Hunger Vital Sign     Worried About Running Out of Food in the Last Year: Never true     Ran Out of Food in the Last Year: Never true   Physical Activity: Unknown (6/27/2024)    Exercise Vital Sign     Days of Exercise per Week: 0 days   Stress: Stress Concern Present (6/27/2024)    [employer] [place] [employer] - Occupational Stress Questionnaire     Feeling of Stress : To some extent   Housing Stability: Unknown (6/27/2024)    Housing Stability Vital Sign     Unable to Pay for Housing in the Last Year: No     Medication List with Changes/Refills   Current Medications    HYDROCODONE-ACETAMINOPHEN (NORCO) 5-325 MG PER TABLET    Take 1 tablet by mouth every 6 (six) hours as needed for Pain.    LISINOPRIL (PRINIVIL,ZESTRIL) 40 MG TABLET    Take 1 tablet (40 mg total) by mouth every evening.     Review of patient's allergies indicates:   Allergen Reactions    Poison ivy extract Blisters, Itching, Rash and Swelling    Wasp venom Hives and Swelling       Objective:     Right Hand  2+ rad pulse  Comp soft  Sensation intact  Inc C/D/I  No SOI  Cap refill < 2 sec  Motor intact to hand and wrist    Assessment:       Encounter Diagnosis   Name Primary?    S/P excision of ganglion cyst Yes          Plan:       Bebeto was seen today for pain and post-op evaluation.    Diagnoses and all orders for this visit:    S/P excision of ganglion cyst        Bebeto Ho is an established pt here for postop follow-up after right carpal tunnel release by Dr. Chavarria.  He still has pain medication to take if needed. The incision was cleaned with hydrogen peroxide and normal saline. A sterile Band-Aid was applied.  Patient may clean the incision daily as above.  He was instructed to keep the incision clean and dry until follow-up.  Patient may use carpal tunnel splint as needed for symptomatic relief.  Patient should notify the office of any signs or symptoms of  infection including fevers, erythema, purulent drainage, increasing pain.  Patient will follow up as scheduled.  He verbalizes understanding and agrees.    No follow-ups on file.    The patient understands, chooses and consents to this plan and accepts all   the risks which include but are not limited to the risks mentioned above.     Disclaimer: This note was prepared using a voice recognition system and is likely to have sound alike errors within the text.

## 2024-10-18 ENCOUNTER — OFFICE VISIT (OUTPATIENT)
Dept: ORTHOPEDICS | Facility: CLINIC | Age: 34
End: 2024-10-18
Payer: COMMERCIAL

## 2024-10-18 ENCOUNTER — OFFICE VISIT (OUTPATIENT)
Dept: FAMILY MEDICINE | Facility: CLINIC | Age: 34
End: 2024-10-18
Payer: COMMERCIAL

## 2024-10-18 ENCOUNTER — HOSPITAL ENCOUNTER (OUTPATIENT)
Dept: RADIOLOGY | Facility: HOSPITAL | Age: 34
Discharge: HOME | End: 2024-10-18
Attending: FAMILY MEDICINE
Payer: COMMERCIAL

## 2024-10-18 ENCOUNTER — PATIENT MESSAGE (OUTPATIENT)
Dept: FAMILY MEDICINE | Facility: CLINIC | Age: 34
End: 2024-10-18

## 2024-10-18 ENCOUNTER — PATIENT MESSAGE (OUTPATIENT)
Dept: SLEEP MEDICINE | Facility: CLINIC | Age: 34
End: 2024-10-18
Payer: COMMERCIAL

## 2024-10-18 VITALS
TEMPERATURE: 97 F | WEIGHT: 294 LBS | HEART RATE: 107 BPM | DIASTOLIC BLOOD PRESSURE: 78 MMHG | BODY MASS INDEX: 39.87 KG/M2 | SYSTOLIC BLOOD PRESSURE: 128 MMHG | OXYGEN SATURATION: 98 %

## 2024-10-18 VITALS — HEIGHT: 72 IN | WEIGHT: 294.56 LBS | BODY MASS INDEX: 39.9 KG/M2

## 2024-10-18 DIAGNOSIS — E78.00 PURE HYPERCHOLESTEROLEMIA: ICD-10-CM

## 2024-10-18 DIAGNOSIS — Z23 NEED FOR VACCINATION: ICD-10-CM

## 2024-10-18 DIAGNOSIS — F41.9 ANXIETY: ICD-10-CM

## 2024-10-18 DIAGNOSIS — M25.472 LEFT ANKLE SWELLING: ICD-10-CM

## 2024-10-18 DIAGNOSIS — E66.01 SEVERE OBESITY (BMI >= 40): ICD-10-CM

## 2024-10-18 DIAGNOSIS — Z98.890 S/P EXCISION OF GANGLION CYST: Primary | ICD-10-CM

## 2024-10-18 DIAGNOSIS — I10 PRIMARY HYPERTENSION: Primary | ICD-10-CM

## 2024-10-18 DIAGNOSIS — G47.33 OSA (OBSTRUCTIVE SLEEP APNEA): ICD-10-CM

## 2024-10-18 PROCEDURE — 73600 X-RAY EXAM OF ANKLE: CPT | Mod: 26,LT,, | Performed by: RADIOLOGY

## 2024-10-18 PROCEDURE — 73600 X-RAY EXAM OF ANKLE: CPT | Mod: TC,PO,LT

## 2024-10-18 PROCEDURE — 99999 PR PBB SHADOW E&M-EST. PATIENT-LVL III: CPT | Mod: PBBFAC,,, | Performed by: ORTHOPAEDIC SURGERY

## 2024-10-18 PROCEDURE — 99999 PR PBB SHADOW E&M-EST. PATIENT-LVL III: CPT | Mod: PBBFAC,,, | Performed by: FAMILY MEDICINE

## 2024-10-18 RX ORDER — LISINOPRIL AND HYDROCHLOROTHIAZIDE 12.5; 2 MG/1; MG/1
2 TABLET ORAL DAILY
Qty: 180 TABLET | Refills: 3 | Status: SHIPPED | OUTPATIENT
Start: 2024-10-18 | End: 2025-10-18

## 2024-10-18 NOTE — PROGRESS NOTES
"Subjective:       Patient ID: Bebeto Ho is a 33 y.o. male.    Chief Complaint: Leg Swelling      HPI Comments:       Current Outpatient Medications:     lisinopriL-hydrochlorothiazide (PRINZIDE,ZESTORETIC) 20-12.5 mg per tablet, Take 2 tablets by mouth once daily., Disp: 180 tablet, Rfl: 3    HYDROcodone-acetaminophen (NORCO) 5-325 mg per tablet, Take 1 tablet by mouth every 6 (six) hours as needed for Pain., Disp: 15 tablet, Rfl: 0      Last seen by me 3 months ago.      Follow-up on hypertension.  Increase lisinopril last time.  For the most part his blood pressure readings has been improved and normal.  Still gets a frequent "white coat" phenomenon when he is in medical settings.  Has situational anxiety in his seeing a therapist for it which has been helpful so far.      Uses his CPAP every night.      Asked about weight loss medication.  Has lost 5 lb since our last visit.  Nothing special done in the interim.  No history of pancreatitis.  No family or personal history of thyroid cancer.  No trouble with GERD.  Will contact his insurance company for medications that might be covered given his BMI in his sleep apnea.  He does not have any glucose abnormalities.    Saw Dermatology for the lots on his left ankle and swelling.  She diagnosed pigmented purpuric dermatosis and recommended compression stockings.  He wants to know if there is anything else we can try.  I will add HCTZ to his lisinopril today to see if that helps.  He also has some aching in that ankle toward the end of the day.  Will x-ray today      Review of Systems   Constitutional:  Negative for activity change and unexpected weight change.   HENT:  Negative for hearing loss, rhinorrhea and trouble swallowing.    Eyes:  Negative for discharge and visual disturbance.   Respiratory:  Negative for chest tightness and wheezing.    Cardiovascular:  Negative for chest pain and palpitations.   Gastrointestinal:  Negative for blood in stool, " constipation, diarrhea and vomiting.   Endocrine: Negative for polydipsia and polyuria.   Genitourinary:  Negative for difficulty urinating, hematuria and urgency.   Musculoskeletal:  Positive for joint swelling. Negative for arthralgias and neck pain.   Neurological:  Negative for weakness and headaches.   Psychiatric/Behavioral:  Negative for confusion and dysphoric mood.        Objective:      Vitals:    10/18/24 1011   BP: 128/78   Pulse: 107   Temp: 97.1 °F (36.2 °C)   SpO2: 98%   Weight: 133.3 kg (293 lb 15.7 oz)   PainSc: 0-No pain     Physical Exam  Vitals and nursing note reviewed.   Constitutional:       General: He is not in acute distress.     Appearance: He is well-developed. He is not diaphoretic.   HENT:      Head: Normocephalic.   Neck:      Thyroid: No thyromegaly.   Cardiovascular:      Rate and Rhythm: Normal rate and regular rhythm.      Heart sounds: Normal heart sounds. No murmur heard.  Pulmonary:      Effort: Pulmonary effort is normal.      Breath sounds: Normal breath sounds. No wheezing or rales.   Abdominal:      General: There is no distension.      Palpations: Abdomen is soft.   Musculoskeletal:      Cervical back: Neck supple.      Left lower leg: Edema present.   Lymphadenopathy:      Cervical: No cervical adenopathy.   Skin:     General: Skin is warm and dry.   Neurological:      Mental Status: He is alert and oriented to person, place, and time.   Psychiatric:         Mood and Affect: Mood normal.         Behavior: Behavior normal.         Thought Content: Thought content normal.         Judgment: Judgment normal.         Assessment:       1. Primary hypertension    2. Pure hypercholesterolemia    3. QIANA (obstructive sleep apnea)    4. Severe obesity (BMI >= 40)    5. Anxiety    6. Left ankle swelling        Plan:   Primary hypertension  Comments:  Still high in  medical settings.  Add HCTZ.  Follow-up 3 months    Pure hypercholesterolemia  Comments:  Dietary modification    QIANA  (obstructive sleep apnea)  Comments:  Compliant with CPAP    Severe obesity (BMI >= 40)  Comments:  Weight down 5 lb.  Will investigate GLP access.    Anxiety  Comments:  Continue therapy    Left ankle swelling  Comments:  Add HCTZ.  X-ray of ankle  Orders:  -     X-Ray Ankle 2 View Left; Future; Expected date: 10/18/2024    Other orders  -     lisinopriL-hydrochlorothiazide (PRINZIDE,ZESTORETIC) 20-12.5 mg per tablet; Take 2 tablets by mouth once daily.  Dispense: 180 tablet; Refill: 3

## 2024-10-18 NOTE — PROGRESS NOTES
Subjective:     Patient ID: Bebeto Ho is a 33 y.o. male.    Chief Complaint: Pain and Post-op Evaluation of the Right Wrist      HPI:  The patient is a 33-year-old male status post right wrist radial volar ganglion cyst excision date of surgery was 10/03/2024.  He reports for suture removal today.    Past Medical History:   Diagnosis Date    Hypertension     Sleep apnea      Past Surgical History:   Procedure Laterality Date    EAR TUBE REMOVAL      at 6 years of age    EXCISION OF GANGLION OF WRIST Right 10/3/2024    Procedure: EXCISION, GANGLION CYST, WRIST;  Surgeon: Kenneth Chavarria MD;  Location: [facility];  Service: Orthopedics;  Laterality: Right;  excision ganglion cyst right radial volar wrist    VASECTOMY       Family History   Family history unknown: Yes     Social History     Socioeconomic History    Marital status:     Number of children: 2   Occupational History    Occupation: teacher   Tobacco Use    Smoking status: Never     Passive exposure: Never    Smokeless tobacco: Never   Substance and Sexual Activity    Alcohol use: Yes     Alcohol/week: 15.0 standard drinks of alcohol     Types: 15 Drinks containing 0.5 oz of alcohol per week    Drug use: Never    Sexual activity: Yes     Partners: Female     Social Drivers of Health     Financial Resource Strain: Low Risk  (6/27/2024)    Overall Financial Resource Strain (CARDIA)     Difficulty of Paying Living Expenses: Not very hard   Food Insecurity: No Food Insecurity (6/27/2024)    Hunger Vital Sign     Worried About Running Out of Food in the Last Year: Never true     Ran Out of Food in the Last Year: Never true   Physical Activity: Unknown (6/27/2024)    Exercise Vital Sign     Days of Exercise per Week: 0 days   Stress: Stress Concern Present (6/27/2024)    [place] [employer] - Occupational Stress Questionnaire     Feeling of Stress : To some extent   Housing Stability: Unknown (6/27/2024)    Housing Stability  Vital Sign     Unable to Pay for Housing in the Last Year: No     Medication List with Changes/Refills   Current Medications    HYDROCODONE-ACETAMINOPHEN (NORCO) 5-325 MG PER TABLET    Take 1 tablet by mouth every 6 (six) hours as needed for Pain.    LISINOPRIL (PRINIVIL,ZESTRIL) 40 MG TABLET    Take 1 tablet (40 mg total) by mouth every evening.     Review of patient's allergies indicates:   Allergen Reactions    Poison ivy extract Blisters, Itching, Rash and Swelling    Wasp venom Hives and Swelling     Review of Systems   Constitutional: Negative for malaise/fatigue.   HENT:  Negative for hearing loss.    Eyes:  Negative for double vision and visual disturbance.   Cardiovascular:  Negative for chest pain.   Respiratory:  Positive for sleep disturbances due to breathing. Negative for shortness of breath.    Endocrine: Negative for cold intolerance.   Hematologic/Lymphatic: Does not bruise/bleed easily.   Skin:  Negative for poor wound healing and suspicious lesions.   Musculoskeletal:  Negative for gout, joint pain and joint swelling.   Gastrointestinal:  Negative for nausea and vomiting.   Genitourinary:  Negative for dysuria.   Neurological:  Negative for numbness, paresthesias and sensory change.   Psychiatric/Behavioral:  Negative for depression, memory loss and substance abuse. The patient is not nervous/anxious.    Allergic/Immunologic: Negative for persistent infections.       Objective:   Body mass index is 39.95 kg/m².  There were no vitals filed for this visit.             General    Constitutional: He is oriented to person, place, and time. He appears well-developed and well-nourished. No distress.   HENT:   Head: Normocephalic.   Eyes: EOM are normal.   Pulmonary/Chest: Effort normal.   Neurological: He is oriented to person, place, and time.   Psychiatric: He has a normal mood and affect.             Right Hand/Wrist Exam     Inspection   Scars: Wrist - present Hand -  present  Effusion: Wrist -  absent Hand -  absent    Other     Neuorologic Exam    Median Distribution: normal  Ulnar Distribution: normal  Radial Distribution: normal    Comments:  There is a right wrist radial volar scar with a suture line intact.  There is no sign of infection.  There is no motor or sensory deficit.          Vascular Exam       Capillary Refill  Right Hand: normal capillary refill          Relevant imaging results reviewed and interpreted by me, discussed with the patient and / or family today radiographs were not obtained today  Assessment:     Encounter Diagnosis   Name Primary?    S/P excision of ganglion cyst Yes      Right radial volar wrist  Plan:     The patient had the sutures removed today.  Steri-Strips were applied.  Wound care was discussed.  He seems to be doing well and will return on an as needed basis.              Disclaimer: This note was prepared using a voice recognition system and is likely to have sound alike errors within the text.

## 2024-10-23 DIAGNOSIS — E66.01 SEVERE OBESITY (BMI >= 40): Primary | ICD-10-CM

## 2024-10-23 DIAGNOSIS — R06.81 APNEA: ICD-10-CM

## 2024-10-25 RX ORDER — SEMAGLUTIDE 0.25 MG/.5ML
0.25 INJECTION, SOLUTION SUBCUTANEOUS
Qty: 0.5 ML | Refills: 7 | Status: SHIPPED | OUTPATIENT
Start: 2024-10-25

## 2024-10-28 ENCOUNTER — PATIENT MESSAGE (OUTPATIENT)
Dept: FAMILY MEDICINE | Facility: CLINIC | Age: 34
End: 2024-10-28
Payer: COMMERCIAL

## 2024-10-28 ENCOUNTER — TELEPHONE (OUTPATIENT)
Dept: FAMILY MEDICINE | Facility: CLINIC | Age: 34
End: 2024-10-28
Payer: COMMERCIAL

## 2024-10-29 ENCOUNTER — OFFICE VISIT (OUTPATIENT)
Dept: SLEEP MEDICINE | Facility: CLINIC | Age: 34
End: 2024-10-29
Payer: COMMERCIAL

## 2024-10-29 VITALS — BODY MASS INDEX: 39.86 KG/M2 | WEIGHT: 293.88 LBS

## 2024-10-29 DIAGNOSIS — E66.01 SEVERE OBESITY (BMI >= 40): ICD-10-CM

## 2024-10-29 DIAGNOSIS — G47.33 OSA (OBSTRUCTIVE SLEEP APNEA): Primary | ICD-10-CM

## 2024-10-29 DIAGNOSIS — I10 PRIMARY HYPERTENSION: ICD-10-CM

## 2024-10-29 PROCEDURE — 3008F BODY MASS INDEX DOCD: CPT | Mod: CPTII,95,, | Performed by: NURSE PRACTITIONER

## 2024-10-29 PROCEDURE — 3044F HG A1C LEVEL LT 7.0%: CPT | Mod: CPTII,95,, | Performed by: NURSE PRACTITIONER

## 2024-10-29 PROCEDURE — 4010F ACE/ARB THERAPY RXD/TAKEN: CPT | Mod: CPTII,95,, | Performed by: NURSE PRACTITIONER

## 2024-10-29 PROCEDURE — 1160F RVW MEDS BY RX/DR IN RCRD: CPT | Mod: CPTII,95,, | Performed by: NURSE PRACTITIONER

## 2024-10-29 PROCEDURE — 99213 OFFICE O/P EST LOW 20 MIN: CPT | Mod: 95,,, | Performed by: NURSE PRACTITIONER

## 2024-10-29 PROCEDURE — 1159F MED LIST DOCD IN RCRD: CPT | Mod: CPTII,95,, | Performed by: NURSE PRACTITIONER

## 2024-10-29 NOTE — PROGRESS NOTES
Subjective:      Patient ID: Bebeto Ho is a 33 y.o. male.    Chief Complaint: Sleep Apnea (IDL)  The patient location is: [place]  The chief complaint leading to consultation is: sleep apnea  Visit type: Virtual visit with synchronous audio and video  Total time spent with patient: 12 min   Each patient to whom he or she provides medical services by telemedicine is:  (1) informed of the relationship between the physician and patient and the respective role of any other health care provider with respect to management of the patient; and (2) notified that he or she may decline to receive medical services by telemedicine and may withdraw from such care at any time.    HPI  Presents for sleep apnea on AutoPAP therapy. Patient states improved symptoms with use of AutoPAP. Sleeping more soundly. Waking up feeling more refreshed. Improved daytime sleepiness. Patient states he is benefiting from use of the AutoPAP.         Patient Active Problem List   Diagnosis    Primary hypertension    Apnea    Severe obesity (BMI >= 40)    QIANA (obstructive sleep apnea)    Ganglion cyst     Wt 133.3 kg (293 lb 14 oz)   BMI 39.86 kg/m²   Body mass index is 39.86 kg/m².    Review of Systems   Constitutional:  Positive for fatigue.   Respiratory:  Positive for snoring and somnolence.    Psychiatric/Behavioral:  Positive for sleep disturbance.    All other systems reviewed and are negative.    Objective:      Physical Exam  Constitutional:       Appearance: Normal appearance. He is well-developed.   HENT:      Head: Normocephalic and atraumatic.      Nose: Nose normal.   Eyes:      General: Lids are normal.   Pulmonary:      Effort: Pulmonary effort is normal. No tachypnea, bradypnea, accessory muscle usage or respiratory distress.   Musculoskeletal:      Cervical back: Normal range of motion.   Skin:     Findings: No rash.   Neurological:      Mental Status: He is alert and oriented to person, place, and time.   Psychiatric:          Behavior: Behavior normal.         Thought Content: Thought content normal.         Judgment: Judgment normal.       Personal Diagnostic Review      10/29/2024     3:26 PM   EPWORTH SLEEPINESS SCALE   Sitting and reading 0   Watching TV 0   Sitting, inactive in a public place (e.g. a theatre or a meeting) 0   As a passenger in a car for an hour without a break 0   Lying down to rest in the afternoon when circumstances permit 1   Sitting and talking to someone 0   Sitting quietly after a lunch without alcohol 0   In a car, while stopped for a few minutes in traffic 0   Total score 1      Compliance  Payor Standard  Usage 09/25/2024 - 10/24/2024  Usage days 30/30 days (100%)  >= 4 hours 30 days (100%)  < 4 hours 0 days (0%)  Usage hours 244 hours 42 minutes  Average usage (total days) 8 hours 9 minutes  Average usage (days used) 8 hours 9 minutes  Median usage (days used) 8 hours 10 minutes  Total used hours (value since last reset - 10/24/2024) 556 hours  AirSense 11 AutoSet  Serial number [id number]  Mode AutoSet  Min Pressure 5 cmH2O  Max Pressure 16 cmH2O  EPR Fulltime  EPR level 3  Response Standard  Therapy  Pressure - cmH2O Median: 5.3 95th percentile: 6.8 Maximum: 7.9  Leaks - L/min Median: 4.0 95th percentile: 11.4 Maximum: 17.3  Events per hour AI: 0.1 HI: 0.1 AHI: 0.2  Apnea Index Central: 0.0 Obstructive: 0.1 Unknown: 0.0  RERA Index 0.0    Assessment:       1. QIANA (obstructive sleep apnea)    2. Severe obesity (BMI >= 40)    3. Primary hypertension        Outpatient Encounter Medications as of 10/29/2024   Medication Sig Dispense Refill    lisinopriL-hydrochlorothiazide (PRINZIDE,ZESTORETIC) 20-12.5 mg per tablet Take 2 tablets by mouth once daily. 180 tablet 3    semaglutide, weight loss, (WEGOVY) 0.25 mg/0.5 mL PnIj Inject 0.25 mg into the skin every 7 days. 0.5 mL 7    tirzepatide 2.5 mg/0.5 mL PnIj Inject 2.5 mg into the skin every 7 days. 4 Pen 3    [DISCONTINUED] HYDROcodone-acetaminophen (NORCO)  5-325 mg per tablet Take 1 tablet by mouth every 6 (six) hours as needed for Pain. 15 tablet 0    [DISCONTINUED] lisinopriL (PRINIVIL,ZESTRIL) 40 MG tablet Take 1 tablet (40 mg total) by mouth every evening. 90 tablet 1     No facility-administered encounter medications on file as of 10/29/2024.     No orders of the defined types were placed in this encounter.    Plan:     Compliant with PAP and benefits from use. Follow up annually in the sleep clinic.    1. QIANA (obstructive sleep apnea)    2. Severe obesity (BMI >= 40)  Overview:  Goal to lose to 250 lb over the next few years.  Cut back alcohol.  Increase exercise      3. Primary hypertension  Overview:  Start lisinopril 20 mg.  Recheck at lab visit next week.  Follow-up here in one-month.                          Elizabeth LeJeune, ACNP, ANP

## 2024-10-30 ENCOUNTER — PATIENT MESSAGE (OUTPATIENT)
Dept: FAMILY MEDICINE | Facility: CLINIC | Age: 34
End: 2024-10-30
Payer: COMMERCIAL

## 2024-11-22 ENCOUNTER — OFFICE VISIT (OUTPATIENT)
Dept: PSYCHIATRY | Facility: CLINIC | Age: 34
End: 2024-11-22
Payer: COMMERCIAL

## 2024-11-22 DIAGNOSIS — F41.1 GENERALIZED ANXIETY DISORDER WITH PANIC ATTACKS: Primary | ICD-10-CM

## 2024-11-22 DIAGNOSIS — G47.00 INSOMNIA, UNSPECIFIED TYPE: ICD-10-CM

## 2024-11-22 DIAGNOSIS — F41.0 GENERALIZED ANXIETY DISORDER WITH PANIC ATTACKS: Primary | ICD-10-CM

## 2024-11-22 PROCEDURE — 3044F HG A1C LEVEL LT 7.0%: CPT | Mod: CPTII,S$GLB,, | Performed by: SOCIAL WORKER

## 2024-11-22 PROCEDURE — 99999 PR PBB SHADOW E&M-EST. PATIENT-LVL I: CPT | Mod: PBBFAC,,, | Performed by: SOCIAL WORKER

## 2024-11-22 PROCEDURE — 4010F ACE/ARB THERAPY RXD/TAKEN: CPT | Mod: CPTII,S$GLB,, | Performed by: SOCIAL WORKER

## 2024-11-22 PROCEDURE — 90834 PSYTX W PT 45 MINUTES: CPT | Mod: S$GLB,,, | Performed by: SOCIAL WORKER

## 2024-11-26 ENCOUNTER — PATIENT MESSAGE (OUTPATIENT)
Dept: SURGERY | Facility: HOSPITAL | Age: 34
End: 2024-11-26
Payer: COMMERCIAL
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
## 2025-01-14 ENCOUNTER — LAB VISIT (OUTPATIENT)
Dept: LAB | Facility: HOSPITAL | Age: 35
End: 2025-01-14
Attending: FAMILY MEDICINE
Payer: COMMERCIAL

## 2025-01-14 ENCOUNTER — OFFICE VISIT (OUTPATIENT)
Dept: FAMILY MEDICINE | Facility: CLINIC | Age: 35
End: 2025-01-14
Payer: COMMERCIAL

## 2025-01-14 VITALS
WEIGHT: 310.06 LBS | SYSTOLIC BLOOD PRESSURE: 110 MMHG | DIASTOLIC BLOOD PRESSURE: 78 MMHG | TEMPERATURE: 98 F | HEART RATE: 110 BPM | RESPIRATION RATE: 16 BRPM | HEIGHT: 72 IN | BODY MASS INDEX: 42 KG/M2 | OXYGEN SATURATION: 96 %

## 2025-01-14 DIAGNOSIS — R49.0 HOARSENESS: ICD-10-CM

## 2025-01-14 DIAGNOSIS — G47.33 OSA (OBSTRUCTIVE SLEEP APNEA): ICD-10-CM

## 2025-01-14 DIAGNOSIS — E78.00 PURE HYPERCHOLESTEROLEMIA: ICD-10-CM

## 2025-01-14 DIAGNOSIS — I10 PRIMARY HYPERTENSION: Primary | ICD-10-CM

## 2025-01-14 DIAGNOSIS — E66.01 SEVERE OBESITY (BMI >= 40): ICD-10-CM

## 2025-01-14 DIAGNOSIS — I10 PRIMARY HYPERTENSION: ICD-10-CM

## 2025-01-14 LAB
ANION GAP SERPL CALC-SCNC: 11 MMOL/L (ref 8–16)
BUN SERPL-MCNC: 20 MG/DL (ref 6–20)
CALCIUM SERPL-MCNC: 10 MG/DL (ref 8.7–10.5)
CHLORIDE SERPL-SCNC: 105 MMOL/L (ref 95–110)
CO2 SERPL-SCNC: 23 MMOL/L (ref 23–29)
CREAT SERPL-MCNC: 1 MG/DL (ref 0.5–1.4)
EST. GFR  (NO RACE VARIABLE): >60 ML/MIN/1.73 M^2
GLUCOSE SERPL-MCNC: 98 MG/DL (ref 70–110)
POTASSIUM SERPL-SCNC: 4.5 MMOL/L (ref 3.5–5.1)
SODIUM SERPL-SCNC: 139 MMOL/L (ref 136–145)

## 2025-01-14 PROCEDURE — 3074F SYST BP LT 130 MM HG: CPT | Mod: CPTII,S$GLB,, | Performed by: FAMILY MEDICINE

## 2025-01-14 PROCEDURE — 3078F DIAST BP <80 MM HG: CPT | Mod: CPTII,S$GLB,, | Performed by: FAMILY MEDICINE

## 2025-01-14 PROCEDURE — 3008F BODY MASS INDEX DOCD: CPT | Mod: CPTII,S$GLB,, | Performed by: FAMILY MEDICINE

## 2025-01-14 PROCEDURE — 80048 BASIC METABOLIC PNL TOTAL CA: CPT | Performed by: FAMILY MEDICINE

## 2025-01-14 PROCEDURE — 99999 PR PBB SHADOW E&M-EST. PATIENT-LVL IV: CPT | Mod: PBBFAC,,, | Performed by: FAMILY MEDICINE

## 2025-01-14 PROCEDURE — 36415 COLL VENOUS BLD VENIPUNCTURE: CPT | Mod: PO | Performed by: FAMILY MEDICINE

## 2025-01-14 PROCEDURE — G2211 COMPLEX E/M VISIT ADD ON: HCPCS | Mod: S$GLB,,, | Performed by: FAMILY MEDICINE

## 2025-01-14 PROCEDURE — 1159F MED LIST DOCD IN RCRD: CPT | Mod: CPTII,S$GLB,, | Performed by: FAMILY MEDICINE

## 2025-01-14 PROCEDURE — 99214 OFFICE O/P EST MOD 30 MIN: CPT | Mod: S$GLB,,, | Performed by: FAMILY MEDICINE

## 2025-01-14 NOTE — PROGRESS NOTES
Subjective:       Patient ID: Bebeto Ho is a 34 y.o. male.    Chief Complaint: Follow-up (3 month )      HPI Comments:       Current Outpatient Medications:     lisinopriL-hydrochlorothiazide (PRINZIDE,ZESTORETIC) 20-12.5 mg per tablet, Take 2 tablets by mouth once daily., Disp: 180 tablet, Rfl: 3    semaglutide, weight loss, (WEGOVY) 0.25 mg/0.5 mL PnIj, Inject 0.25 mg into the skin every 7 days., Disp: 0.5 mL, Rfl: 7    tirzepatide 2.5 mg/0.5 mL PnIj, Inject 2.5 mg into the skin every 7 days., Disp: 4 Pen, Rfl: 3      Three-month follow-up.      Not able to get GLP treatment covered by insurance.  I told him he needed to be diabetic, despite QIANA and BMI now 42.  He has gained 15 lb since our last visit.    Taking lisinopril and HCTZ together.  Blood pressure control good.  Helps somewhat with his swelling in his ankles and feet.    Has a ganglion cyst removed successfully from his right wrist.      Stop this therapy after a couple of sessions.  Felt that was not really helping.  Also feels that his anxiety has subsided    One new concerned today:  Unusual voice tightness and restriction.  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  Can not attributed to PND or sore throat or anything else.  Will get an ENT consult      Review of Systems   Constitutional:  Positive for unexpected weight change. Negative for activity change, appetite change and fever.   HENT:  Positive for voice change. Negative for sore throat.    Respiratory:  Negative for cough and shortness of breath.    Cardiovascular:  Negative for chest pain.   Gastrointestinal:  Negative for abdominal pain, diarrhea and nausea.   Genitourinary:  Negative for difficulty urinating.   Musculoskeletal:  Negative for arthralgias and myalgias.   Neurological:  Negative for dizziness and headaches.       Objective:      Vitals:    01/14/25 0816   BP: 110/78   Pulse: 110   Resp: 16   Temp: 97.7 °F (36.5 °C)   TempSrc: Tympanic   SpO2: 96%    Weight: (!) 140.6 kg (310 lb 1.2 oz)   Height: 6' (1.829 m)   PainSc: 0-No pain     Physical Exam  Vitals and nursing note reviewed.   Constitutional:       General: He is not in acute distress.     Appearance: He is well-developed. He is not diaphoretic.   HENT:      Head: Normocephalic.   Neck:      Thyroid: No thyromegaly.   Cardiovascular:      Rate and Rhythm: Normal rate and regular rhythm.      Heart sounds: Normal heart sounds. No murmur heard.  Pulmonary:      Effort: Pulmonary effort is normal.      Breath sounds: Normal breath sounds. No wheezing or rales.   Abdominal:      General: There is no distension.      Palpations: Abdomen is soft.   Musculoskeletal:      Cervical back: Neck supple.   Lymphadenopathy:      Cervical: No cervical adenopathy.   Skin:     General: Skin is warm and dry.   Neurological:      Mental Status: He is alert and oriented to person, place, and time.   Psychiatric:         Mood and Affect: Mood normal.         Behavior: Behavior normal.         Thought Content: Thought content normal.         Judgment: Judgment normal.         Assessment:       1. Primary hypertension    2. Severe obesity (BMI >= 40)    3. Uses CPAP    4. Pure hypercholesterolemia    5. Hoarseness        Plan:   Primary hypertension  Comments:  Controlled.  BMP today.  Follow-up 6 months  Orders:  -     Basic Metabolic Panel; Future; Expected date: 01/14/2025    Severe obesity (BMI >= 40)  Comments:  Ongoing weight gain.  Focused on diet and exercise    Uses CPAP  Comments:  Uses CPAP regularly    Pure hypercholesterolemia  Comments:  .  Managing through diet    Hoarseness  Comments:  ENT consult  Orders:  -     Ambulatory referral/consult to ENT; Future; Expected date: 01/21/2025

## 2025-01-24 ENCOUNTER — OFFICE VISIT (OUTPATIENT)
Dept: OTOLARYNGOLOGY | Facility: CLINIC | Age: 35
End: 2025-01-24
Payer: COMMERCIAL

## 2025-01-24 VITALS — WEIGHT: 309.94 LBS | BODY MASS INDEX: 42.04 KG/M2

## 2025-01-24 DIAGNOSIS — R49.0 HOARSENESS: ICD-10-CM

## 2025-01-24 DIAGNOSIS — K21.9 LARYNGOPHARYNGEAL REFLUX (LPR): Primary | ICD-10-CM

## 2025-01-24 PROCEDURE — 1159F MED LIST DOCD IN RCRD: CPT | Mod: CPTII,S$GLB,, | Performed by: STUDENT IN AN ORGANIZED HEALTH CARE EDUCATION/TRAINING PROGRAM

## 2025-01-24 PROCEDURE — 99999 PR PBB SHADOW E&M-EST. PATIENT-LVL III: CPT | Mod: PBBFAC,,, | Performed by: STUDENT IN AN ORGANIZED HEALTH CARE EDUCATION/TRAINING PROGRAM

## 2025-01-24 PROCEDURE — 4010F ACE/ARB THERAPY RXD/TAKEN: CPT | Mod: CPTII,S$GLB,, | Performed by: STUDENT IN AN ORGANIZED HEALTH CARE EDUCATION/TRAINING PROGRAM

## 2025-01-24 PROCEDURE — 99204 OFFICE O/P NEW MOD 45 MIN: CPT | Mod: 25,S$GLB,, | Performed by: STUDENT IN AN ORGANIZED HEALTH CARE EDUCATION/TRAINING PROGRAM

## 2025-01-24 PROCEDURE — 31575 DIAGNOSTIC LARYNGOSCOPY: CPT | Mod: S$GLB,,, | Performed by: STUDENT IN AN ORGANIZED HEALTH CARE EDUCATION/TRAINING PROGRAM

## 2025-01-24 PROCEDURE — 3008F BODY MASS INDEX DOCD: CPT | Mod: CPTII,S$GLB,, | Performed by: STUDENT IN AN ORGANIZED HEALTH CARE EDUCATION/TRAINING PROGRAM

## 2025-01-24 RX ORDER — OMEPRAZOLE 20 MG/1
20 CAPSULE, DELAYED RELEASE ORAL DAILY
Qty: 90 CAPSULE | Refills: 0 | Status: SHIPPED | OUTPATIENT
Start: 2025-01-24 | End: 2026-01-24

## 2025-01-24 NOTE — PROGRESS NOTES
Chief complaint:    Chief Complaint   Patient presents with    voice problems     Pt states that from time to time his voice seems to tighten up and like something is stuck in it and sometimes it takes a minute to clear it up and sometimes when he wakes up in the morning one side of his nose is stopped up and he is having a post nasal drip           Referring Provider:  Joon Duarte Md  [address] [place] [id number]    History of present illness:     Mr. Ho is a 34 y.o. presenting for evaluation of hoarseness.     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  He was drinking heavily for a couple years, but now completely stopped the last couple weeks        History      Past Medical History:   Past Medical History:   Diagnosis Date    Hypertension     Sleep apnea          Past Surgical History:  Past Surgical History:   Procedure Laterality Date    EAR TUBE REMOVAL      at 6 years of age    EXCISION OF GANGLION OF WRIST Right 10/3/2024    Procedure: EXCISION, GANGLION CYST, WRIST;  Surgeon: Kenneth Chavarria MD;  Location: [facility];  Service: Orthopedics;  Laterality: Right;  excision ganglion cyst right radial volar wrist    VASECTOMY           Medications: Medication list reviewed. He  has a current  medication list which includes the following prescription(s): lisinopril-hydrochlorothiazide, wegovy, and tirzepatide.     Allergies:   Review of patient's allergies indicates:   Allergen Reactions    Poison ivy extract Blisters, Itching, Rash and Swelling    Wasp venom Hives and Swelling         Family history: family history includes Diabetes in his mother; Heart disease in his maternal grandmother; Mental illness in his brother.         Social History          Alcohol use:  reports current alcohol use of about 40.0 standard drinks of alcohol per week.            Tobacco:  reports that he has never smoked. He has never been exposed to tobacco smoke. He has never used smokeless tobacco.         Physical Examination      Vitals: Weight (!) 140.6 kg (309 lb 15.5 oz).      General: Well developed, well nourished, well hydrated.    Voice: no dysphonia, no dysarthria      Head/Face: Normocephalic, atraumatic. No scars or lesions. Facial musculature equal.     Eyes: No scleral icterus or conjunctival hemorrhage. EOMI. PERRLA.     Ears:     Right ear: No gross deformity. EAC is clear of debris and erythema. TM are intact with a pneumatized middle ear. No signs of retraction, fluid or infection.      Left ear: No gross deformity. EAC is clear of debris and erythema. TM are intact with a pneumatized middle ear. No signs of retraction, fluid or infection.      Nose: No gross deformity or lesions. No purulent discharge. No significant NSD.      Mouth/Oropharynx: Lips without any lesions. No mucosal lesions within the oropharynx. No tonsillar exudate or lesions. Pharyngeal walls symmetrical. Uvula midline. Tongue midline without lesions.     Neck: Trachea midline. No masses. No thyromegaly or nodules palpated.     Lymphatic: No lymphadenopathy in the neck.     Extremities: No cyanosis. Warm and well-perfused.     Skin: No scars or lesions on face or neck.      Neurologic: Moving all extremities without gross abnormality.CN  II-XII grossly intact. House-Brackmann 1/6. No signs of nystagmus.          Data reviewed      Review of records:      I reviewed records from the referring provider's office visits, including the history, workup, and/or treatment of this problem thus far.    Home sleep study   mild(RDI=11) based on a 1% hypopnea desaturation criteria with confirmation by  surrogate arousal indicators. The apneas/hypopneas are accompanied by minimal oxygen desaturation (percent time  below 90% SpO2: 0%, Min SpO2: 84.2%). The average desaturation across all sleep disordered breathing events is 2.5%.  Snoring occurs for 11.7% (30 dB) of the study, 1.1% is very loud. The mean pulse rate is 80.2 BPM, with frequent pulse rate  variability (56 events with >= 6 BPM increase/decrease per hour).  patient slept supine 99.4% of the night     Procedures:    Procedure -Transnasal fiberoptic laryngoscopy     Surgeon: Anderson Lamar M.D. .      Anesthesia: topical 0.05% oxymetazoline with 4% lidocaine      Complications: None.     Description of Procedure: With the patient in the sitting position, topical lidocaine and oxymetazoline was applied to the nose. The scope was passed through the nose. Examination was carried out of the nose, nasopharynx, oropharynx, hypopharynx, and larynx with findings as noted above. Scope was removed. The patient tolerated the procedure well.      Findings: No masses or lesions in the nose, nasopharynx, oropharynx, hypopharynx, or larynx. Vocal fold abduction and adduction is normal. No pooling of secretions in the piriform sinuses, penetration, or aspiration.          Assessment/Plan:    1. Hoarseness      The patient has evidence of laryngopharyngeal reflux (LPR).  We discussed behavioral modifications such as raising the head of the bed, avoiding tight clothing or belts, avoiding eating within 2 hours of laying down, and weight loss. We also discussed dietary changes that can improve acid reflux symptoms,  including, but not limited to avoidance of caffeine, peppermints, alcohol, greasy and fried foods, tomato-based sauces, and tobacco. H2 blockers (ie. Pepcid) or antacids (ie. Tums) can help as well. However, for persisting chronic or daily symptoms, a trial of proton pump inhibitors (ie. Omeprazole) is recommended. This is to be taken 30 minutes prior to breakfast and consistently for at least 3-4 weeks before you may notice improvement in symptoms.   The also did recently stop drinking, which has helped. He will continue his lifestyle modifications and f/u in 3 months if not resolved.          Anderson Lamar MD  [facility]   [facility] - [address]  [place] [id number]  P: [phone]  F: [phone]

## 2025-01-31 ENCOUNTER — PATIENT MESSAGE (OUTPATIENT)
Dept: OTOLARYNGOLOGY | Facility: CLINIC | Age: 35
End: 2025-01-31
Payer: COMMERCIAL

## 2025-02-03 NOTE — TELEPHONE ENCOUNTER
Either first thing when he wakes up or 30 min before his first meal is probably comparable, but the pharmacist would likely know the best.

## 2025-06-24 ENCOUNTER — PATIENT MESSAGE (OUTPATIENT)
Dept: FAMILY MEDICINE | Facility: CLINIC | Age: 35
End: 2025-06-24
Payer: COMMERCIAL

## 2025-07-20 ENCOUNTER — PATIENT MESSAGE (OUTPATIENT)
Dept: FAMILY MEDICINE | Facility: CLINIC | Age: 35
End: 2025-07-20
Payer: COMMERCIAL

## 2025-07-25 ENCOUNTER — LAB VISIT (OUTPATIENT)
Dept: LAB | Facility: HOSPITAL | Age: 35
End: 2025-07-25
Attending: FAMILY MEDICINE
Payer: COMMERCIAL

## 2025-07-25 ENCOUNTER — OFFICE VISIT (OUTPATIENT)
Dept: FAMILY MEDICINE | Facility: CLINIC | Age: 35
End: 2025-07-25
Payer: COMMERCIAL

## 2025-07-25 VITALS
RESPIRATION RATE: 16 BRPM | HEART RATE: 100 BPM | OXYGEN SATURATION: 98 % | SYSTOLIC BLOOD PRESSURE: 130 MMHG | DIASTOLIC BLOOD PRESSURE: 80 MMHG | HEIGHT: 72 IN | BODY MASS INDEX: 39.88 KG/M2 | TEMPERATURE: 98 F | WEIGHT: 294.44 LBS

## 2025-07-25 DIAGNOSIS — I10 PRIMARY HYPERTENSION: ICD-10-CM

## 2025-07-25 DIAGNOSIS — E78.2 MIXED HYPERLIPIDEMIA: ICD-10-CM

## 2025-07-25 DIAGNOSIS — G47.33 OSA (OBSTRUCTIVE SLEEP APNEA): ICD-10-CM

## 2025-07-25 DIAGNOSIS — Z00.00 WELLNESS EXAMINATION: ICD-10-CM

## 2025-07-25 DIAGNOSIS — E66.01 SEVERE OBESITY (BMI >= 40): ICD-10-CM

## 2025-07-25 DIAGNOSIS — Z00.00 WELLNESS EXAMINATION: Primary | ICD-10-CM

## 2025-07-25 LAB
ANION GAP (OHS): 9 MMOL/L (ref 8–16)
BUN SERPL-MCNC: 13 MG/DL (ref 6–20)
CALCIUM SERPL-MCNC: 9.6 MG/DL (ref 8.7–10.5)
CHLORIDE SERPL-SCNC: 101 MMOL/L (ref 95–110)
CHOLEST SERPL-MCNC: 222 MG/DL (ref 120–199)
CHOLEST/HDLC SERPL: 4 {RATIO} (ref 2–5)
CO2 SERPL-SCNC: 27 MMOL/L (ref 23–29)
CREAT SERPL-MCNC: 1 MG/DL (ref 0.5–1.4)
EAG (OHS): 100 MG/DL (ref 68–131)
GFR SERPLBLD CREATININE-BSD FMLA CKD-EPI: >60 ML/MIN/1.73/M2
GLUCOSE SERPL-MCNC: 98 MG/DL (ref 70–110)
HBA1C MFR BLD: 5.1 % (ref 4–5.6)
HDLC SERPL-MCNC: 56 MG/DL (ref 40–75)
HDLC SERPL: 25.2 % (ref 20–50)
LDLC SERPL CALC-MCNC: 144.4 MG/DL (ref 63–159)
NONHDLC SERPL-MCNC: 166 MG/DL
POTASSIUM SERPL-SCNC: 5.2 MMOL/L (ref 3.5–5.1)
SODIUM SERPL-SCNC: 137 MMOL/L (ref 136–145)
TRIGL SERPL-MCNC: 108 MG/DL (ref 30–150)

## 2025-07-25 PROCEDURE — 80048 BASIC METABOLIC PNL TOTAL CA: CPT

## 2025-07-25 PROCEDURE — 99999 PR PBB SHADOW E&M-EST. PATIENT-LVL III: CPT | Mod: PBBFAC,,, | Performed by: FAMILY MEDICINE

## 2025-07-25 PROCEDURE — 83036 HEMOGLOBIN GLYCOSYLATED A1C: CPT

## 2025-07-25 PROCEDURE — 80061 LIPID PANEL: CPT

## 2025-07-25 PROCEDURE — 36415 COLL VENOUS BLD VENIPUNCTURE: CPT | Mod: PO

## 2025-07-25 NOTE — PROGRESS NOTES
Subjective:       Patient ID: Bebeto Ho is a 34 y.o. male.    Chief Complaint: Hypertension (6 month f/u )      HPI Comments:     Current Medications[1]      Last seen by me 6 months ago.    Here for biometric testing.      Asked about premature heart disease.  No family history.  No symptoms.  Normal EKG.  No further workup indicated at this time    Has some mild swelling in his legs at the end of the day.  Should get better with weight loss     Hoarseness went away when he took his PPI for the prescribed period of time.  No longer on daily PPI.  Cutting out alcohol seems have helped as well    Weight is down 15 lb today.  Did not do anything special    Uses CPAP every day.      Review of Systems   Constitutional:  Negative for activity change, appetite change and fever.   HENT:  Negative for sore throat.    Respiratory:  Negative for cough and shortness of breath.    Cardiovascular:  Negative for chest pain.   Gastrointestinal:  Negative for abdominal pain, diarrhea and nausea.   Genitourinary:  Negative for difficulty urinating.   Musculoskeletal:  Negative for arthralgias and myalgias.   Neurological:  Negative for dizziness and headaches.       Objective:      Vitals:    07/25/25 0846   BP: 130/80   Pulse: 100   Resp: 16   Temp: 97.8 °F (36.6 °C)   TempSrc: Tympanic   SpO2: 98%   Weight: 133.6 kg (294 lb 6.8 oz)   Height: 6' (1.829 m)   PainSc: 0-No pain     Physical Exam  Vitals and nursing note reviewed.   Constitutional:       General: He is not in acute distress.     Appearance: He is well-developed. He is not diaphoretic.   HENT:      Head: Normocephalic.   Neck:      Thyroid: No thyromegaly.   Cardiovascular:      Rate and Rhythm: Normal rate and regular rhythm.      Heart sounds: Normal heart sounds. No murmur heard.  Pulmonary:      Effort: Pulmonary effort is normal.      Breath sounds: Normal breath sounds. No wheezing or rales.   Abdominal:      General: There is no distension.      Palpations:  Abdomen is soft.   Musculoskeletal:      Cervical back: Neck supple.      Right lower leg: No edema.      Left lower leg: No edema.   Lymphadenopathy:      Cervical: No cervical adenopathy.   Skin:     General: Skin is warm and dry.   Neurological:      Mental Status: He is alert and oriented to person, place, and time.   Psychiatric:         Mood and Affect: Mood normal.         Behavior: Behavior normal.         Thought Content: Thought content normal.         Judgment: Judgment normal.         Assessment:       1. Wellness examination    2. Severe obesity (BMI >= 40)    3. Primary hypertension    4. Uses CPAP    5. Mixed hyperlipidemia        Plan:   Wellness examination  Comments:  Biometric testing today.  Encouraged by some weight loss.  Orders:  -     Hemoglobin A1C; Future; Expected date: 07/25/2025    Severe obesity (BMI >= 40)  Comments:  Weight down 15 lb    Primary hypertension  Comments:  Stable.  Follow-up 1 year  Orders:  -     Basic Metabolic Panel; Future; Expected date: 07/25/2025    Uses CPAP  Comments:  Nightly    Mixed hyperlipidemia  Comments:  Lipid profile  Orders:  -     Lipid Panel; Future; Expected date: 07/25/2025                 [1]   Current Outpatient Medications:     lisinopriL-hydrochlorothiazide (PRINZIDE,ZESTORETIC) 20-12.5 mg per tablet, Take 2 tablets by mouth once daily., Disp: 180 tablet, Rfl: 3

## 2025-07-26 ENCOUNTER — PATIENT MESSAGE (OUTPATIENT)
Dept: FAMILY MEDICINE | Facility: CLINIC | Age: 35
End: 2025-07-26
Payer: COMMERCIAL

## (undated) DEVICE — DRAPE THREE-QTR REINF 53X77IN

## (undated) DEVICE — SCRUB DYNA-HEX LIQ 4% CHG 4OZ

## (undated) DEVICE — DRAPE U SPLIT SHEET 54X76IN

## (undated) DEVICE — SUT 4/0 18IN COATED VICRYL

## (undated) DEVICE — GOWN NONREINF SET-IN SLV 2XL

## (undated) DEVICE — GLOVE BIOGEL SZ 8 1/2

## (undated) DEVICE — PACK BASIC SETUP SC BR

## (undated) DEVICE — SOL IRR SOD CHL .9% POUR

## (undated) DEVICE — UNDERGLOVES BIOGEL PI SIZE 7.5

## (undated) DEVICE — UNDERGLOVES BIOGEL PI SIZE 8.5

## (undated) DEVICE — PAD ABDOMINAL STERILE 8X10IN

## (undated) DEVICE — BANDAGE ESMARK ELASTIC ST 4X9

## (undated) DEVICE — SUT 4-0 ETHILON 18 PS-2

## (undated) DEVICE — PAD CAST SPECIALIST STRL 3

## (undated) DEVICE — DRESSING XEROFORM NONADH 1X8IN

## (undated) DEVICE — UNDERGLOVES BIOGEL PI SZ 7 LF

## (undated) DEVICE — APPLICATOR CHLORAPREP ORN 26ML

## (undated) DEVICE — TOWEL OR DISP STRL BLUE 4/PK

## (undated) DEVICE — SYR 10CC LUER LOCK

## (undated) DEVICE — SPONGE COTTON TRAY 4X4IN

## (undated) DEVICE — BANDAGE ELASTIC 3X5 VELCRO ST

## (undated) DEVICE — ALCOHOL 70% ISOP RUBBING 4OZ

## (undated) DEVICE — COVER LIGHT HANDLE 80/CA

## (undated) DEVICE — DRAPE HAND STERILE

## (undated) DEVICE — GOWN POLY REINF BRTH SLV XL

## (undated) DEVICE — COVER CAMERA OPERATING ROOM

## (undated) DEVICE — KIT TURNOVER

## (undated) DEVICE — NDL SAFETY 25G X 1.5 ECLIPSE

## (undated) DEVICE — GLOVE SURGICAL LATEX SZ 7